# Patient Record
Sex: MALE | Race: WHITE | NOT HISPANIC OR LATINO | Employment: OTHER | URBAN - METROPOLITAN AREA
[De-identification: names, ages, dates, MRNs, and addresses within clinical notes are randomized per-mention and may not be internally consistent; named-entity substitution may affect disease eponyms.]

---

## 2017-01-01 ENCOUNTER — APPOINTMENT (OUTPATIENT)
Dept: CARDIOLOGY | Facility: HOSPITAL | Age: 82
End: 2017-01-01
Attending: INTERNAL MEDICINE

## 2017-01-01 ENCOUNTER — HOSPITAL ENCOUNTER (INPATIENT)
Facility: HOSPITAL | Age: 82
End: 2017-01-01
Attending: INTERNAL MEDICINE | Admitting: INTERNAL MEDICINE

## 2017-01-01 ENCOUNTER — DOCUMENTATION (OUTPATIENT)
Dept: PSYCHIATRY | Facility: HOSPITAL | Age: 82
End: 2017-01-01

## 2017-01-01 ENCOUNTER — HOSPITAL ENCOUNTER (INPATIENT)
Facility: HOSPITAL | Age: 82
LOS: 6 days | End: 2017-08-24
Attending: INTERNAL MEDICINE | Admitting: INTERNAL MEDICINE

## 2017-01-01 ENCOUNTER — HOSPITAL ENCOUNTER (INPATIENT)
Facility: HOSPITAL | Age: 82
LOS: 12 days | End: 2017-08-18
Attending: SPECIALIST | Admitting: SPECIALIST

## 2017-01-01 ENCOUNTER — APPOINTMENT (OUTPATIENT)
Dept: GENERAL RADIOLOGY | Facility: HOSPITAL | Age: 82
End: 2017-01-01

## 2017-01-01 ENCOUNTER — HOSPITAL ENCOUNTER (INPATIENT)
Facility: HOSPITAL | Age: 82
LOS: 3 days | End: 2017-08-06
Attending: EMERGENCY MEDICINE | Admitting: HOSPITALIST

## 2017-01-01 VITALS
RESPIRATION RATE: 18 BRPM | TEMPERATURE: 98.5 F | DIASTOLIC BLOOD PRESSURE: 83 MMHG | SYSTOLIC BLOOD PRESSURE: 145 MMHG | BODY MASS INDEX: 29.78 KG/M2 | OXYGEN SATURATION: 94 % | HEART RATE: 72 BPM | WEIGHT: 162.8 LBS

## 2017-01-01 VITALS
SYSTOLIC BLOOD PRESSURE: 125 MMHG | RESPIRATION RATE: 16 BRPM | TEMPERATURE: 98.2 F | HEART RATE: 86 BPM | WEIGHT: 176 LBS | BODY MASS INDEX: 32.39 KG/M2 | DIASTOLIC BLOOD PRESSURE: 55 MMHG | HEIGHT: 62 IN | OXYGEN SATURATION: 94 %

## 2017-01-01 VITALS — TEMPERATURE: 103 F

## 2017-01-01 DIAGNOSIS — R50.9 FEVER IN ADULT: ICD-10-CM

## 2017-01-01 DIAGNOSIS — N39.0 ACUTE UTI: Primary | ICD-10-CM

## 2017-01-01 LAB
ALBUMIN SERPL-MCNC: 3.4 G/DL (ref 3.5–5.2)
ALBUMIN/GLOB SERPL: 1 G/DL
ALP SERPL-CCNC: 83 U/L (ref 39–117)
ALT SERPL W P-5'-P-CCNC: 20 U/L (ref 1–41)
ANION GAP SERPL CALCULATED.3IONS-SCNC: 13.9 MMOL/L
ANION GAP SERPL CALCULATED.3IONS-SCNC: 14.4 MMOL/L
ANION GAP SERPL CALCULATED.3IONS-SCNC: 15.6 MMOL/L
AST SERPL-CCNC: 21 U/L (ref 1–40)
BACTERIA BLD CULT: ABNORMAL
BACTERIA SPEC AEROBE CULT: ABNORMAL
BACTERIA SPEC AEROBE CULT: ABNORMAL
BACTERIA SPEC AEROBE CULT: NORMAL
BACTERIA UR QL AUTO: ABNORMAL /HPF
BASOPHILS # BLD AUTO: 0.02 10*3/MM3 (ref 0–0.2)
BASOPHILS # BLD AUTO: 0.05 10*3/MM3 (ref 0–0.2)
BASOPHILS NFR BLD AUTO: 0.1 % (ref 0–1.5)
BASOPHILS NFR BLD AUTO: 0.5 % (ref 0–1.5)
BH CV ECHO MEAS - ACS: 2.1 CM
BH CV ECHO MEAS - AO MAX PG: 7 MMHG
BH CV ECHO MEAS - AO MEAN PG (FULL): -1 MMHG
BH CV ECHO MEAS - AO MEAN PG: 3 MMHG
BH CV ECHO MEAS - AO ROOT AREA (BSA CORRECTED): 2.2
BH CV ECHO MEAS - AO ROOT AREA: 11.9 CM^2
BH CV ECHO MEAS - AO ROOT DIAM: 3.9 CM
BH CV ECHO MEAS - AO V2 MAX: 134 CM/SEC
BH CV ECHO MEAS - AO V2 MEAN: 83 CM/SEC
BH CV ECHO MEAS - AO V2 VTI: 24.2 CM
BH CV ECHO MEAS - AVA(I,A): 5.2 CM^2
BH CV ECHO MEAS - AVA(I,D): 5.2 CM^2
BH CV ECHO MEAS - BSA(HAYCOCK): 1.9 M^2
BH CV ECHO MEAS - BSA: 1.8 M^2
BH CV ECHO MEAS - BZI_BMI: 31.3 KILOGRAMS/M^2
BH CV ECHO MEAS - BZI_METRIC_HEIGHT: 157.5 CM
BH CV ECHO MEAS - BZI_METRIC_WEIGHT: 77.6 KG
BH CV ECHO MEAS - CONTRAST EF (2CH): 70.5 ML/M^2
BH CV ECHO MEAS - CONTRAST EF 4CH: 69.7 ML/M^2
BH CV ECHO MEAS - EDV(CUBED): 103.8 ML
BH CV ECHO MEAS - EDV(MOD-SP2): 88 ML
BH CV ECHO MEAS - EDV(MOD-SP4): 89 ML
BH CV ECHO MEAS - EDV(TEICH): 102.4 ML
BH CV ECHO MEAS - EF(CUBED): 78.9 %
BH CV ECHO MEAS - EF(MOD-SP2): 70.5 %
BH CV ECHO MEAS - EF(MOD-SP4): 69.7 %
BH CV ECHO MEAS - EF(TEICH): 71.1 %
BH CV ECHO MEAS - ESV(CUBED): 22 ML
BH CV ECHO MEAS - ESV(MOD-SP2): 26 ML
BH CV ECHO MEAS - ESV(MOD-SP4): 27 ML
BH CV ECHO MEAS - ESV(TEICH): 29.6 ML
BH CV ECHO MEAS - FS: 40.4 %
BH CV ECHO MEAS - IVS/LVPW: 1
BH CV ECHO MEAS - IVSD: 1.1 CM
BH CV ECHO MEAS - LA DIMENSION: 4 CM
BH CV ECHO MEAS - LA/AO: 1
BH CV ECHO MEAS - LAT PEAK E' VEL: 4 CM/SEC
BH CV ECHO MEAS - LV DIASTOLIC VOL/BSA (35-75): 49.8 ML/M^2
BH CV ECHO MEAS - LV MASS(C)D: 187.5 GRAMS
BH CV ECHO MEAS - LV MASS(C)DI: 104.9 GRAMS/M^2
BH CV ECHO MEAS - LV MEAN PG: 4 MMHG
BH CV ECHO MEAS - LV SYSTOLIC VOL/BSA (12-30): 15.1 ML/M^2
BH CV ECHO MEAS - LV V1 MAX: 133 CM/SEC
BH CV ECHO MEAS - LV V1 MEAN: 101 CM/SEC
BH CV ECHO MEAS - LV V1 VTI: 30.5 CM
BH CV ECHO MEAS - LVIDD: 4.7 CM
BH CV ECHO MEAS - LVIDS: 2.8 CM
BH CV ECHO MEAS - LVLD AP2: 8.3 CM
BH CV ECHO MEAS - LVLD AP4: 8.7 CM
BH CV ECHO MEAS - LVLS AP2: 7.3 CM
BH CV ECHO MEAS - LVLS AP4: 7.7 CM
BH CV ECHO MEAS - LVOT AREA (M): 4.2 CM^2
BH CV ECHO MEAS - LVOT AREA: 4.2 CM^2
BH CV ECHO MEAS - LVOT DIAM: 2.3 CM
BH CV ECHO MEAS - LVPWD: 1.1 CM
BH CV ECHO MEAS - MED PEAK E' VEL: 4 CM/SEC
BH CV ECHO MEAS - MV A DUR: 0.15 SEC
BH CV ECHO MEAS - MV A MAX VEL: 112 CM/SEC
BH CV ECHO MEAS - MV DEC SLOPE: 432 CM/SEC^2
BH CV ECHO MEAS - MV DEC TIME: 0.33 SEC
BH CV ECHO MEAS - MV E MAX VEL: 72.6 CM/SEC
BH CV ECHO MEAS - MV E/A: 0.65
BH CV ECHO MEAS - MV MEAN PG: 2 MMHG
BH CV ECHO MEAS - MV P1/2T MAX VEL: 86.9 CM/SEC
BH CV ECHO MEAS - MV P1/2T: 58.9 MSEC
BH CV ECHO MEAS - MV V2 MEAN: 67.5 CM/SEC
BH CV ECHO MEAS - MV V2 VTI: 26.3 CM
BH CV ECHO MEAS - MVA P1/2T LCG: 2.5 CM^2
BH CV ECHO MEAS - MVA(P1/2T): 3.7 CM^2
BH CV ECHO MEAS - MVA(VTI): 4.8 CM^2
BH CV ECHO MEAS - PA ACC SLOPE: 869 CM/SEC^2
BH CV ECHO MEAS - PA ACC TIME: 0.13 SEC
BH CV ECHO MEAS - PA MAX PG (FULL): 2.5 MMHG
BH CV ECHO MEAS - PA MAX PG: 5.2 MMHG
BH CV ECHO MEAS - PA PR(ACCEL): 20.5 MMHG
BH CV ECHO MEAS - PA V2 MAX: 114 CM/SEC
BH CV ECHO MEAS - PI END-D VEL: 97 CM/SEC
BH CV ECHO MEAS - PULM A REVS DUR: 0.15 SEC
BH CV ECHO MEAS - PULM A REVS VEL: 37.5 CM/SEC
BH CV ECHO MEAS - PULM DIAS VEL: 38.5 CM/SEC
BH CV ECHO MEAS - PULM S/D: 1.4
BH CV ECHO MEAS - PULM SYS VEL: 52.8 CM/SEC
BH CV ECHO MEAS - PVA(V,A): 2.5 CM^2
BH CV ECHO MEAS - PVA(V,D): 2.5 CM^2
BH CV ECHO MEAS - QP/QS: 0.37
BH CV ECHO MEAS - RAP SYSTOLE: 3 MMHG
BH CV ECHO MEAS - RV MAX PG: 2.7 MMHG
BH CV ECHO MEAS - RV MEAN PG: 1 MMHG
BH CV ECHO MEAS - RV V1 MAX: 81.9 CM/SEC
BH CV ECHO MEAS - RV V1 MEAN: 53.8 CM/SEC
BH CV ECHO MEAS - RV V1 VTI: 13.4 CM
BH CV ECHO MEAS - RVOT AREA: 3.5 CM^2
BH CV ECHO MEAS - RVOT DIAM: 2.1 CM
BH CV ECHO MEAS - RVSP: 33.7 MMHG
BH CV ECHO MEAS - SI(AO): 161.6 ML/M^2
BH CV ECHO MEAS - SI(CUBED): 45.8 ML/M^2
BH CV ECHO MEAS - SI(LVOT): 70.9 ML/M^2
BH CV ECHO MEAS - SI(MOD-SP2): 34.7 ML/M^2
BH CV ECHO MEAS - SI(MOD-SP4): 34.7 ML/M^2
BH CV ECHO MEAS - SI(TEICH): 40.7 ML/M^2
BH CV ECHO MEAS - SV(AO): 289.1 ML
BH CV ECHO MEAS - SV(CUBED): 81.9 ML
BH CV ECHO MEAS - SV(LVOT): 126.7 ML
BH CV ECHO MEAS - SV(MOD-SP2): 62 ML
BH CV ECHO MEAS - SV(MOD-SP4): 62 ML
BH CV ECHO MEAS - SV(RVOT): 46.4 ML
BH CV ECHO MEAS - SV(TEICH): 72.8 ML
BH CV ECHO MEAS - TAPSE (>1.6): 1.8 CM2
BH CV ECHO MEAS - TR MAX VEL: 277 CM/SEC
BH CV VAS BP RIGHT ARM: NORMAL MMHG
BH CV XLRA - RV BASE: 2.8 CM
BH CV XLRA - RV LENGTH: 7.4 CM
BH CV XLRA - RV MID: 2.2 CM
BH CV XLRA - TDI S': 19 CM/SEC
BILIRUB SERPL-MCNC: 0.5 MG/DL (ref 0.1–1.2)
BILIRUB UR QL STRIP: NEGATIVE
BUN BLD-MCNC: 15 MG/DL (ref 8–23)
BUN BLD-MCNC: 18 MG/DL (ref 8–23)
BUN BLD-MCNC: 24 MG/DL (ref 8–23)
BUN/CREAT SERPL: 16 (ref 7–25)
BUN/CREAT SERPL: 18.6 (ref 7–25)
BUN/CREAT SERPL: 18.8 (ref 7–25)
CALCIUM SPEC-SCNC: 8.4 MG/DL (ref 8.6–10.5)
CALCIUM SPEC-SCNC: 8.8 MG/DL (ref 8.6–10.5)
CALCIUM SPEC-SCNC: 8.9 MG/DL (ref 8.6–10.5)
CHLORIDE SERPL-SCNC: 100 MMOL/L (ref 98–107)
CHLORIDE SERPL-SCNC: 101 MMOL/L (ref 98–107)
CHLORIDE SERPL-SCNC: 103 MMOL/L (ref 98–107)
CHOLEST SERPL-MCNC: 118 MG/DL (ref 0–200)
CLARITY UR: ABNORMAL
CO2 SERPL-SCNC: 19.4 MMOL/L (ref 22–29)
CO2 SERPL-SCNC: 22.1 MMOL/L (ref 22–29)
CO2 SERPL-SCNC: 22.6 MMOL/L (ref 22–29)
COLOR UR: ABNORMAL
CREAT BLD-MCNC: 0.94 MG/DL (ref 0.76–1.27)
CREAT BLD-MCNC: 0.97 MG/DL (ref 0.76–1.27)
CREAT BLD-MCNC: 1.28 MG/DL (ref 0.76–1.27)
D-LACTATE SERPL-SCNC: 2 MMOL/L (ref 0.5–2)
DEPRECATED RDW RBC AUTO: 53.3 FL (ref 37–54)
DEPRECATED RDW RBC AUTO: 54.1 FL (ref 37–54)
DEPRECATED RDW RBC AUTO: 55.5 FL (ref 37–54)
E/E' RATIO: 19
EOSINOPHIL # BLD AUTO: 0.01 10*3/MM3 (ref 0–0.7)
EOSINOPHIL # BLD AUTO: 0.05 10*3/MM3 (ref 0–0.7)
EOSINOPHIL NFR BLD AUTO: 0.1 % (ref 0.3–6.2)
EOSINOPHIL NFR BLD AUTO: 0.5 % (ref 0.3–6.2)
ERYTHROCYTE [DISTWIDTH] IN BLOOD BY AUTOMATED COUNT: 15.2 % (ref 11.5–14.5)
ERYTHROCYTE [DISTWIDTH] IN BLOOD BY AUTOMATED COUNT: 15.4 % (ref 11.5–14.5)
ERYTHROCYTE [DISTWIDTH] IN BLOOD BY AUTOMATED COUNT: 15.6 % (ref 11.5–14.5)
GFR SERPL CREATININE-BSD FRML MDRD: 54 ML/MIN/1.73
GFR SERPL CREATININE-BSD FRML MDRD: 74 ML/MIN/1.73
GFR SERPL CREATININE-BSD FRML MDRD: 76 ML/MIN/1.73
GLOBULIN UR ELPH-MCNC: 3.4 GM/DL
GLUCOSE BLD-MCNC: 105 MG/DL (ref 65–99)
GLUCOSE BLD-MCNC: 119 MG/DL (ref 65–99)
GLUCOSE BLD-MCNC: 124 MG/DL (ref 65–99)
GLUCOSE BLDC GLUCOMTR-MCNC: 106 MG/DL (ref 70–130)
GLUCOSE UR STRIP-MCNC: NEGATIVE MG/DL
GRAM STN SPEC: ABNORMAL
HCT VFR BLD AUTO: 31.2 % (ref 40.4–52.2)
HCT VFR BLD AUTO: 33.1 % (ref 40.4–52.2)
HCT VFR BLD AUTO: 33.3 % (ref 40.4–52.2)
HDLC SERPL-MCNC: 29 MG/DL (ref 40–60)
HGB BLD-MCNC: 10.2 G/DL (ref 13.7–17.6)
HGB BLD-MCNC: 10.4 G/DL (ref 13.7–17.6)
HGB BLD-MCNC: 10.8 G/DL (ref 13.7–17.6)
HGB UR QL STRIP.AUTO: ABNORMAL
HOLD SPECIMEN: NORMAL
HOLD SPECIMEN: NORMAL
HYALINE CASTS UR QL AUTO: ABNORMAL /LPF
IMM GRANULOCYTES # BLD: 0.04 10*3/MM3 (ref 0–0.03)
IMM GRANULOCYTES # BLD: 0.06 10*3/MM3 (ref 0–0.03)
IMM GRANULOCYTES NFR BLD: 0.4 % (ref 0–0.5)
IMM GRANULOCYTES NFR BLD: 0.4 % (ref 0–0.5)
INR PPP: 1.33 (ref 0.9–1.1)
KETONES UR QL STRIP: NEGATIVE
LDLC SERPL CALC-MCNC: 65 MG/DL (ref 0–100)
LDLC/HDLC SERPL: 2.24 {RATIO}
LEFT ATRIUM VOLUME INDEX: 33 ML/M2
LEFT ATRIUM VOLUME: 52 CM3
LEUKOCYTE ESTERASE UR QL STRIP.AUTO: ABNORMAL
LV EF 2D ECHO EST: 69 %
LYMPHOCYTES # BLD AUTO: 1.42 10*3/MM3 (ref 0.9–4.8)
LYMPHOCYTES # BLD AUTO: 1.93 10*3/MM3 (ref 0.9–4.8)
LYMPHOCYTES NFR BLD AUTO: 10.1 % (ref 19.6–45.3)
LYMPHOCYTES NFR BLD AUTO: 20.8 % (ref 19.6–45.3)
MAGNESIUM SERPL-MCNC: 2 MG/DL (ref 1.6–2.4)
MCH RBC QN AUTO: 30.3 PG (ref 27–32.7)
MCH RBC QN AUTO: 31.1 PG (ref 27–32.7)
MCH RBC QN AUTO: 31.5 PG (ref 27–32.7)
MCHC RBC AUTO-ENTMCNC: 31.2 G/DL (ref 32.6–36.4)
MCHC RBC AUTO-ENTMCNC: 32.6 G/DL (ref 32.6–36.4)
MCHC RBC AUTO-ENTMCNC: 32.7 G/DL (ref 32.6–36.4)
MCV RBC AUTO: 95.4 FL (ref 79.8–96.2)
MCV RBC AUTO: 96.3 FL (ref 79.8–96.2)
MCV RBC AUTO: 97.1 FL (ref 79.8–96.2)
MONOCYTES # BLD AUTO: 1.51 10*3/MM3 (ref 0.2–1.2)
MONOCYTES # BLD AUTO: 1.85 10*3/MM3 (ref 0.2–1.2)
MONOCYTES NFR BLD AUTO: 13.1 % (ref 5–12)
MONOCYTES NFR BLD AUTO: 16.2 % (ref 5–12)
NEUTROPHILS # BLD AUTO: 10.76 10*3/MM3 (ref 1.9–8.1)
NEUTROPHILS # BLD AUTO: 5.72 10*3/MM3 (ref 1.9–8.1)
NEUTROPHILS NFR BLD AUTO: 61.6 % (ref 42.7–76)
NEUTROPHILS NFR BLD AUTO: 76.2 % (ref 42.7–76)
NITRITE UR QL STRIP: NEGATIVE
NT-PROBNP SERPL-MCNC: 1999 PG/ML (ref 0–1800)
PH UR STRIP.AUTO: 7 [PH] (ref 5–8)
PLATELET # BLD AUTO: 230 10*3/MM3 (ref 140–500)
PLATELET # BLD AUTO: 234 10*3/MM3 (ref 140–500)
PLATELET # BLD AUTO: 264 10*3/MM3 (ref 140–500)
PMV BLD AUTO: 10.2 FL (ref 6–12)
PMV BLD AUTO: 10.3 FL (ref 6–12)
PMV BLD AUTO: 10.3 FL (ref 6–12)
POTASSIUM BLD-SCNC: 3.4 MMOL/L (ref 3.5–5.2)
POTASSIUM BLD-SCNC: 4.1 MMOL/L (ref 3.5–5.2)
POTASSIUM BLD-SCNC: 4.1 MMOL/L (ref 3.5–5.2)
PROCALCITONIN SERPL-MCNC: 0.2 NG/ML (ref 0.1–0.25)
PROT SERPL-MCNC: 6.8 G/DL (ref 6–8.5)
PROT UR QL STRIP: ABNORMAL
PROTHROMBIN TIME: 16 SECONDS (ref 11.7–14.2)
RBC # BLD AUTO: 3.24 10*6/MM3 (ref 4.6–6)
RBC # BLD AUTO: 3.43 10*6/MM3 (ref 4.6–6)
RBC # BLD AUTO: 3.47 10*6/MM3 (ref 4.6–6)
RBC # UR: ABNORMAL /HPF
REF LAB TEST METHOD: ABNORMAL
SODIUM BLD-SCNC: 136 MMOL/L (ref 136–145)
SODIUM BLD-SCNC: 138 MMOL/L (ref 136–145)
SODIUM BLD-SCNC: 138 MMOL/L (ref 136–145)
SP GR UR STRIP: 1.01 (ref 1–1.03)
SQUAMOUS #/AREA URNS HPF: ABNORMAL /HPF
TRIGL SERPL-MCNC: 120 MG/DL (ref 0–150)
TROPONIN T SERPL-MCNC: 0.03 NG/ML (ref 0–0.03)
TROPONIN T SERPL-MCNC: 0.05 NG/ML (ref 0–0.03)
TROPONIN T SERPL-MCNC: 0.06 NG/ML (ref 0–0.03)
UROBILINOGEN UR QL STRIP: ABNORMAL
VALPROATE SERPL-MCNC: 37 MCG/ML (ref 50–125)
VANCOMYCIN SERPL-MCNC: 8.5 MCG/ML (ref 5–40)
VLDLC SERPL-MCNC: 24 MG/DL (ref 5–40)
WBC NRBC COR # BLD: 14.12 10*3/MM3 (ref 4.5–10.7)
WBC NRBC COR # BLD: 7.72 10*3/MM3 (ref 4.5–10.7)
WBC NRBC COR # BLD: 9.3 10*3/MM3 (ref 4.5–10.7)
WBC UR QL AUTO: ABNORMAL /HPF
WHOLE BLOOD HOLD SPECIMEN: NORMAL
WHOLE BLOOD HOLD SPECIMEN: NORMAL

## 2017-01-01 PROCEDURE — 93010 ELECTROCARDIOGRAM REPORT: CPT | Performed by: INTERNAL MEDICINE

## 2017-01-01 PROCEDURE — 25010000002 LORAZEPAM PER 2 MG: Performed by: INTERNAL MEDICINE

## 2017-01-01 PROCEDURE — 94799 UNLISTED PULMONARY SVC/PX: CPT

## 2017-01-01 PROCEDURE — 25010000002 MORPHINE PER 10 MG: Performed by: INTERNAL MEDICINE

## 2017-01-01 PROCEDURE — 25010000002 VANCOMYCIN 10 G RECONSTITUTED SOLUTION: Performed by: INTERNAL MEDICINE

## 2017-01-01 PROCEDURE — 99231 SBSQ HOSP IP/OBS SF/LOW 25: CPT | Performed by: INTERNAL MEDICINE

## 2017-01-01 PROCEDURE — 99285 EMERGENCY DEPT VISIT HI MDM: CPT

## 2017-01-01 PROCEDURE — 25010000002 LORAZEPAM PER 2 MG: Performed by: SPECIALIST

## 2017-01-01 PROCEDURE — 25010000002 ENOXAPARIN PER 10 MG: Performed by: HOSPITALIST

## 2017-01-01 PROCEDURE — 71010 HC CHEST PA OR AP: CPT

## 2017-01-01 PROCEDURE — 84145 PROCALCITONIN (PCT): CPT | Performed by: EMERGENCY MEDICINE

## 2017-01-01 PROCEDURE — 81001 URINALYSIS AUTO W/SCOPE: CPT | Performed by: EMERGENCY MEDICINE

## 2017-01-01 PROCEDURE — 99238 HOSP IP/OBS DSCHRG MGMT 30/<: CPT | Performed by: INTERNAL MEDICINE

## 2017-01-01 PROCEDURE — 94640 AIRWAY INHALATION TREATMENT: CPT

## 2017-01-01 PROCEDURE — 80048 BASIC METABOLIC PNL TOTAL CA: CPT | Performed by: HOSPITALIST

## 2017-01-01 PROCEDURE — 85610 PROTHROMBIN TIME: CPT | Performed by: EMERGENCY MEDICINE

## 2017-01-01 PROCEDURE — 84484 ASSAY OF TROPONIN QUANT: CPT | Performed by: INTERNAL MEDICINE

## 2017-01-01 PROCEDURE — 25010000002 HALOPERIDOL LACTATE PER 5 MG: Performed by: SPECIALIST

## 2017-01-01 PROCEDURE — 84484 ASSAY OF TROPONIN QUANT: CPT | Performed by: EMERGENCY MEDICINE

## 2017-01-01 PROCEDURE — 87186 SC STD MICRODIL/AGAR DIL: CPT | Performed by: EMERGENCY MEDICINE

## 2017-01-01 PROCEDURE — 83735 ASSAY OF MAGNESIUM: CPT | Performed by: INTERNAL MEDICINE

## 2017-01-01 PROCEDURE — 82962 GLUCOSE BLOOD TEST: CPT

## 2017-01-01 PROCEDURE — 87040 BLOOD CULTURE FOR BACTERIA: CPT | Performed by: EMERGENCY MEDICINE

## 2017-01-01 PROCEDURE — 83880 ASSAY OF NATRIURETIC PEPTIDE: CPT | Performed by: EMERGENCY MEDICINE

## 2017-01-01 PROCEDURE — 80061 LIPID PANEL: CPT | Performed by: SPECIALIST

## 2017-01-01 PROCEDURE — 85027 COMPLETE CBC AUTOMATED: CPT | Performed by: INTERNAL MEDICINE

## 2017-01-01 PROCEDURE — 85025 COMPLETE CBC W/AUTO DIFF WBC: CPT | Performed by: EMERGENCY MEDICINE

## 2017-01-01 PROCEDURE — 25010000002 CEFTRIAXONE PER 250 MG: Performed by: HOSPITALIST

## 2017-01-01 PROCEDURE — 87147 CULTURE TYPE IMMUNOLOGIC: CPT | Performed by: EMERGENCY MEDICINE

## 2017-01-01 PROCEDURE — 25010000002 ZIPRASIDONE MESYLATE PER 10 MG: Performed by: SPECIALIST

## 2017-01-01 PROCEDURE — 87150 DNA/RNA AMPLIFIED PROBE: CPT | Performed by: EMERGENCY MEDICINE

## 2017-01-01 PROCEDURE — 80053 COMPREHEN METABOLIC PANEL: CPT | Performed by: EMERGENCY MEDICINE

## 2017-01-01 PROCEDURE — 87040 BLOOD CULTURE FOR BACTERIA: CPT | Performed by: INTERNAL MEDICINE

## 2017-01-01 PROCEDURE — 93306 TTE W/DOPPLER COMPLETE: CPT | Performed by: INTERNAL MEDICINE

## 2017-01-01 PROCEDURE — 85025 COMPLETE CBC W/AUTO DIFF WBC: CPT | Performed by: HOSPITALIST

## 2017-01-01 PROCEDURE — 99222 1ST HOSP IP/OBS MODERATE 55: CPT | Performed by: INTERNAL MEDICINE

## 2017-01-01 PROCEDURE — 81003 URINALYSIS AUTO W/O SCOPE: CPT | Performed by: EMERGENCY MEDICINE

## 2017-01-01 PROCEDURE — 93005 ELECTROCARDIOGRAM TRACING: CPT | Performed by: EMERGENCY MEDICINE

## 2017-01-01 PROCEDURE — 87086 URINE CULTURE/COLONY COUNT: CPT | Performed by: EMERGENCY MEDICINE

## 2017-01-01 PROCEDURE — 80164 ASSAY DIPROPYLACETIC ACD TOT: CPT | Performed by: SPECIALIST

## 2017-01-01 PROCEDURE — 25010000002 CEFTRIAXONE PER 250 MG: Performed by: EMERGENCY MEDICINE

## 2017-01-01 PROCEDURE — 80202 ASSAY OF VANCOMYCIN: CPT | Performed by: INTERNAL MEDICINE

## 2017-01-01 PROCEDURE — 90791 PSYCH DIAGNOSTIC EVALUATION: CPT

## 2017-01-01 PROCEDURE — 25010000002 HALOPERIDOL LACTATE PER 5 MG: Performed by: INTERNAL MEDICINE

## 2017-01-01 PROCEDURE — 83605 ASSAY OF LACTIC ACID: CPT | Performed by: EMERGENCY MEDICINE

## 2017-01-01 PROCEDURE — 93005 ELECTROCARDIOGRAM TRACING: CPT | Performed by: INTERNAL MEDICINE

## 2017-01-01 PROCEDURE — 93306 TTE W/DOPPLER COMPLETE: CPT

## 2017-01-01 RX ORDER — MEMANTINE HYDROCHLORIDE 5 MG/1
5 TABLET ORAL NIGHTLY
COMMUNITY

## 2017-01-01 RX ORDER — GLYCOPYRROLATE 0.2 MG/ML
0.4 INJECTION INTRAMUSCULAR; INTRAVENOUS
Status: DISCONTINUED | OUTPATIENT
Start: 2017-01-01 | End: 2017-01-01

## 2017-01-01 RX ORDER — DOXYCYCLINE 100 MG/1
100 CAPSULE ORAL EVERY 12 HOURS SCHEDULED
Refills: 0
Start: 2017-01-01 | End: 2017-01-01 | Stop reason: HOSPADM

## 2017-01-01 RX ORDER — CYCLOBENZAPRINE HCL 5 MG
5 TABLET ORAL DAILY PRN
Status: ON HOLD | COMMUNITY
End: 2017-01-01

## 2017-01-01 RX ORDER — HALOPERIDOL 5 MG/ML
2 INJECTION INTRAMUSCULAR EVERY 4 HOURS PRN
Status: DISCONTINUED | OUTPATIENT
Start: 2017-01-01 | End: 2017-08-25 | Stop reason: HOSPADM

## 2017-01-01 RX ORDER — LORAZEPAM 2 MG/ML
2 INJECTION INTRAMUSCULAR ONCE
Status: COMPLETED | OUTPATIENT
Start: 2017-01-01 | End: 2017-01-01

## 2017-01-01 RX ORDER — LORAZEPAM 2 MG/ML
0.5 CONCENTRATE ORAL
Status: DISCONTINUED | OUTPATIENT
Start: 2017-01-01 | End: 2017-01-01

## 2017-01-01 RX ORDER — ACETAMINOPHEN 325 MG/1
650 TABLET ORAL EVERY 4 HOURS PRN
Refills: 0 | Status: ON HOLD
Start: 2017-01-01 | End: 2017-01-01

## 2017-01-01 RX ORDER — ASPIRIN 81 MG/1
81 TABLET ORAL DAILY
Status: DISCONTINUED | OUTPATIENT
Start: 2017-01-01 | End: 2017-01-01 | Stop reason: SDUPTHER

## 2017-01-01 RX ORDER — CYCLOBENZAPRINE HCL 5 MG
5 TABLET ORAL DAILY PRN
Status: DISCONTINUED | OUTPATIENT
Start: 2017-01-01 | End: 2017-01-01 | Stop reason: HOSPADM

## 2017-01-01 RX ORDER — BUDESONIDE AND FORMOTEROL FUMARATE DIHYDRATE 160; 4.5 UG/1; UG/1
2 AEROSOL RESPIRATORY (INHALATION)
Status: COMPLETED | OUTPATIENT
Start: 2017-01-01 | End: 2017-01-01

## 2017-01-01 RX ORDER — HALOPERIDOL 2 MG/ML
2 SOLUTION ORAL EVERY 4 HOURS PRN
Status: DISCONTINUED | OUTPATIENT
Start: 2017-01-01 | End: 2017-08-25 | Stop reason: HOSPADM

## 2017-01-01 RX ORDER — CHOLECALCIFEROL (VITAMIN D3) 125 MCG
5 CAPSULE ORAL NIGHTLY
Status: COMPLETED | OUTPATIENT
Start: 2017-01-01 | End: 2017-01-01

## 2017-01-01 RX ORDER — CLONIDINE HYDROCHLORIDE 0.1 MG/1
0.1 TABLET ORAL EVERY 4 HOURS PRN
Status: ON HOLD | COMMUNITY
End: 2017-01-01

## 2017-01-01 RX ORDER — CHOLECALCIFEROL (VITAMIN D3) 125 MCG
5 CAPSULE ORAL NIGHTLY PRN
Status: DISCONTINUED | OUTPATIENT
Start: 2017-01-01 | End: 2017-01-01 | Stop reason: HOSPADM

## 2017-01-01 RX ORDER — ONDANSETRON 4 MG/1
4 TABLET, FILM COATED ORAL EVERY 6 HOURS PRN
Status: DISCONTINUED | OUTPATIENT
Start: 2017-01-01 | End: 2017-01-01 | Stop reason: HOSPADM

## 2017-01-01 RX ORDER — LORAZEPAM 2 MG/ML
0.5 INJECTION INTRAMUSCULAR
Status: DISCONTINUED | OUTPATIENT
Start: 2017-01-01 | End: 2017-01-01

## 2017-01-01 RX ORDER — CHOLECALCIFEROL (VITAMIN D3) 125 MCG
5 CAPSULE ORAL NIGHTLY
Status: DISCONTINUED | OUTPATIENT
Start: 2017-01-01 | End: 2017-01-01 | Stop reason: HOSPADM

## 2017-01-01 RX ORDER — QUETIAPINE FUMARATE 50 MG/1
50 TABLET, FILM COATED ORAL NIGHTLY
Status: COMPLETED | OUTPATIENT
Start: 2017-01-01 | End: 2017-01-01

## 2017-01-01 RX ORDER — LORAZEPAM 1 MG/1
1 TABLET ORAL ONCE
Status: COMPLETED | OUTPATIENT
Start: 2017-01-01 | End: 2017-01-01

## 2017-01-01 RX ORDER — MIRTAZAPINE 15 MG/1
7.5 TABLET, FILM COATED ORAL NIGHTLY
Status: DISCONTINUED | OUTPATIENT
Start: 2017-01-01 | End: 2017-01-01 | Stop reason: HOSPADM

## 2017-01-01 RX ORDER — CEFTRIAXONE SODIUM 1 G/50ML
1 INJECTION, SOLUTION INTRAVENOUS EVERY 24 HOURS
Status: DISCONTINUED | OUTPATIENT
Start: 2017-01-01 | End: 2017-01-01

## 2017-01-01 RX ORDER — QUETIAPINE FUMARATE 25 MG/1
12.5 TABLET, FILM COATED ORAL DAILY
Status: ON HOLD | COMMUNITY
End: 2017-01-01

## 2017-01-01 RX ORDER — HALOPERIDOL 2 MG/1
2 TABLET ORAL 3 TIMES DAILY
Status: DISCONTINUED | OUTPATIENT
Start: 2017-01-01 | End: 2017-01-01

## 2017-01-01 RX ORDER — MORPHINE SULFATE 100 MG/5ML
5 SOLUTION ORAL
Status: DISCONTINUED | OUTPATIENT
Start: 2017-01-01 | End: 2017-08-25 | Stop reason: HOSPADM

## 2017-01-01 RX ORDER — SODIUM CHLORIDE 0.9 % (FLUSH) 0.9 %
10 SYRINGE (ML) INJECTION AS NEEDED
Status: DISCONTINUED | OUTPATIENT
Start: 2017-01-01 | End: 2017-01-01 | Stop reason: HOSPADM

## 2017-01-01 RX ORDER — MEMANTINE HYDROCHLORIDE 5 MG/1
5 TABLET ORAL NIGHTLY
Status: COMPLETED | OUTPATIENT
Start: 2017-01-01 | End: 2017-01-01

## 2017-01-01 RX ORDER — IPRATROPIUM BROMIDE AND ALBUTEROL SULFATE 2.5; .5 MG/3ML; MG/3ML
3 SOLUTION RESPIRATORY (INHALATION) EVERY 6 HOURS PRN
Status: DISCONTINUED | OUTPATIENT
Start: 2017-01-01 | End: 2017-01-01 | Stop reason: SDUPTHER

## 2017-01-01 RX ORDER — HALOPERIDOL 5 MG/ML
2 INJECTION INTRAMUSCULAR 3 TIMES DAILY PRN
Status: DISCONTINUED | OUTPATIENT
Start: 2017-01-01 | End: 2017-01-01 | Stop reason: HOSPADM

## 2017-01-01 RX ORDER — FLUTICASONE PROPIONATE 50 MCG
1 SPRAY, SUSPENSION (ML) NASAL DAILY
Status: COMPLETED | OUTPATIENT
Start: 2017-01-01 | End: 2017-01-01

## 2017-01-01 RX ORDER — DIVALPROEX SODIUM 250 MG/1
250 TABLET, DELAYED RELEASE ORAL 4 TIMES DAILY
Status: DISCONTINUED | OUTPATIENT
Start: 2017-01-01 | End: 2017-01-01

## 2017-01-01 RX ORDER — DIVALPROEX SODIUM 250 MG/1
250 TABLET, DELAYED RELEASE ORAL 4 TIMES DAILY
COMMUNITY

## 2017-01-01 RX ORDER — HALOPERIDOL 1 MG/1
1 TABLET ORAL EVERY 4 HOURS PRN
Status: DISCONTINUED | OUTPATIENT
Start: 2017-01-01 | End: 2017-08-25 | Stop reason: HOSPADM

## 2017-01-01 RX ORDER — CHOLECALCIFEROL (VITAMIN D3) 125 MCG
1000 CAPSULE ORAL DAILY
Status: DISCONTINUED | OUTPATIENT
Start: 2017-01-01 | End: 2017-01-01

## 2017-01-01 RX ORDER — CYCLOBENZAPRINE HCL 5 MG
5 TABLET ORAL DAILY
Status: COMPLETED | OUTPATIENT
Start: 2017-01-01 | End: 2017-01-01

## 2017-01-01 RX ORDER — LORAZEPAM 2 MG/ML
0.5 INJECTION INTRAMUSCULAR
Status: DISCONTINUED | OUTPATIENT
Start: 2017-01-01 | End: 2017-08-25 | Stop reason: HOSPADM

## 2017-01-01 RX ORDER — POTASSIUM CHLORIDE 750 MG/1
10 CAPSULE, EXTENDED RELEASE ORAL DAILY
Status: DISCONTINUED | OUTPATIENT
Start: 2017-01-01 | End: 2017-01-01 | Stop reason: HOSPADM

## 2017-01-01 RX ORDER — ACETAMINOPHEN 325 MG/1
650 TABLET ORAL EVERY 4 HOURS PRN
Status: DISCONTINUED | OUTPATIENT
Start: 2017-01-01 | End: 2017-01-01 | Stop reason: SDUPTHER

## 2017-01-01 RX ORDER — MEMANTINE HYDROCHLORIDE 5 MG/1
5 TABLET ORAL NIGHTLY
Status: DISCONTINUED | OUTPATIENT
Start: 2017-01-01 | End: 2017-01-01 | Stop reason: HOSPADM

## 2017-01-01 RX ORDER — FAMOTIDINE 10 MG/ML
10 INJECTION, SOLUTION INTRAVENOUS EVERY 12 HOURS SCHEDULED
Status: DISCONTINUED | OUTPATIENT
Start: 2017-01-01 | End: 2017-01-01 | Stop reason: HOSPADM

## 2017-01-01 RX ORDER — ONDANSETRON 4 MG/1
4 TABLET, ORALLY DISINTEGRATING ORAL EVERY 6 HOURS PRN
Status: DISCONTINUED | OUTPATIENT
Start: 2017-01-01 | End: 2017-01-01 | Stop reason: HOSPADM

## 2017-01-01 RX ORDER — SENNA AND DOCUSATE SODIUM 50; 8.6 MG/1; MG/1
2 TABLET, FILM COATED ORAL NIGHTLY
Status: DISCONTINUED | OUTPATIENT
Start: 2017-01-01 | End: 2017-01-01 | Stop reason: HOSPADM

## 2017-01-01 RX ORDER — SENNA AND DOCUSATE SODIUM 50; 8.6 MG/1; MG/1
2 TABLET, FILM COATED ORAL NIGHTLY
Status: DISCONTINUED | OUTPATIENT
Start: 2017-01-01 | End: 2017-01-01

## 2017-01-01 RX ORDER — OLANZAPINE 5 MG/1
5 TABLET ORAL
Status: DISCONTINUED | OUTPATIENT
Start: 2017-01-01 | End: 2017-01-01 | Stop reason: HOSPADM

## 2017-01-01 RX ORDER — DIVALPROEX SODIUM 125 MG/1
250 CAPSULE, COATED PELLETS ORAL 3 TIMES DAILY
Status: DISCONTINUED | OUTPATIENT
Start: 2017-01-01 | End: 2017-01-01

## 2017-01-01 RX ORDER — HALOPERIDOL 5 MG/ML
2 INJECTION INTRAMUSCULAR EVERY 6 HOURS PRN
Status: DISCONTINUED | OUTPATIENT
Start: 2017-01-01 | End: 2017-01-01

## 2017-01-01 RX ORDER — HALOPERIDOL 1 MG/1
2 TABLET ORAL EVERY 4 HOURS PRN
Status: DISCONTINUED | OUTPATIENT
Start: 2017-01-01 | End: 2017-08-25 | Stop reason: HOSPADM

## 2017-01-01 RX ORDER — SODIUM CHLORIDE 9 MG/ML
75 INJECTION, SOLUTION INTRAVENOUS CONTINUOUS
Status: DISCONTINUED | OUTPATIENT
Start: 2017-01-01 | End: 2017-01-01

## 2017-01-01 RX ORDER — MORPHINE SULFATE 100 MG/5ML
20 SOLUTION ORAL
Status: DISCONTINUED | OUTPATIENT
Start: 2017-01-01 | End: 2017-08-25 | Stop reason: HOSPADM

## 2017-01-01 RX ORDER — SENNOSIDES 8.6 MG
1 TABLET ORAL DAILY
COMMUNITY
End: 2017-01-01 | Stop reason: HOSPADM

## 2017-01-01 RX ORDER — OLANZAPINE 10 MG/1
10 INJECTION, POWDER, LYOPHILIZED, FOR SOLUTION INTRAMUSCULAR ONCE
Status: COMPLETED | OUTPATIENT
Start: 2017-01-01 | End: 2017-01-01

## 2017-01-01 RX ORDER — BISACODYL 10 MG
10 SUPPOSITORY, RECTAL RECTAL DAILY PRN
Status: DISCONTINUED | OUTPATIENT
Start: 2017-01-01 | End: 2017-01-01 | Stop reason: HOSPADM

## 2017-01-01 RX ORDER — QUETIAPINE FUMARATE 25 MG/1
12.5 TABLET, FILM COATED ORAL DAILY
COMMUNITY
End: 2017-01-01 | Stop reason: HOSPADM

## 2017-01-01 RX ORDER — ACETAMINOPHEN 325 MG/1
650 TABLET ORAL EVERY 4 HOURS PRN
Status: DISCONTINUED | OUTPATIENT
Start: 2017-01-01 | End: 2017-01-01 | Stop reason: HOSPADM

## 2017-01-01 RX ORDER — LORAZEPAM 2 MG/ML
1 CONCENTRATE ORAL
Status: DISCONTINUED | OUTPATIENT
Start: 2017-01-01 | End: 2017-08-25 | Stop reason: HOSPADM

## 2017-01-01 RX ORDER — LORAZEPAM 2 MG/ML
2 CONCENTRATE ORAL
Status: DISCONTINUED | OUTPATIENT
Start: 2017-01-01 | End: 2017-08-25 | Stop reason: HOSPADM

## 2017-01-01 RX ORDER — DIVALPROEX SODIUM 125 MG/1
500 CAPSULE, COATED PELLETS ORAL 2 TIMES DAILY
Status: DISCONTINUED | OUTPATIENT
Start: 2017-01-01 | End: 2017-01-01 | Stop reason: HOSPADM

## 2017-01-01 RX ORDER — BUDESONIDE AND FORMOTEROL FUMARATE DIHYDRATE 160; 4.5 UG/1; UG/1
1 AEROSOL RESPIRATORY (INHALATION)
Status: DISCONTINUED | OUTPATIENT
Start: 2017-01-01 | End: 2017-01-01

## 2017-01-01 RX ORDER — MIRTAZAPINE 15 MG/1
7.5 TABLET, FILM COATED ORAL NIGHTLY
Status: COMPLETED | OUTPATIENT
Start: 2017-01-01 | End: 2017-01-01

## 2017-01-01 RX ORDER — LORAZEPAM 2 MG/ML
2 INJECTION INTRAMUSCULAR
Status: DISCONTINUED | OUTPATIENT
Start: 2017-01-01 | End: 2017-01-01

## 2017-01-01 RX ORDER — IPRATROPIUM BROMIDE AND ALBUTEROL SULFATE 2.5; .5 MG/3ML; MG/3ML
3 SOLUTION RESPIRATORY (INHALATION) 4 TIMES DAILY PRN
Status: ON HOLD | COMMUNITY
End: 2017-01-01

## 2017-01-01 RX ORDER — FLUTICASONE PROPIONATE 50 MCG
1 SPRAY, SUSPENSION (ML) NASAL DAILY
Status: ON HOLD | COMMUNITY
End: 2017-01-01

## 2017-01-01 RX ORDER — CYCLOBENZAPRINE HCL 5 MG
5 TABLET ORAL DAILY
Status: DISPENSED | OUTPATIENT
Start: 2017-01-01 | End: 2017-01-01

## 2017-01-01 RX ORDER — ALUMINA, MAGNESIA, AND SIMETHICONE 2400; 2400; 240 MG/30ML; MG/30ML; MG/30ML
15 SUSPENSION ORAL EVERY 6 HOURS PRN
Status: DISCONTINUED | OUTPATIENT
Start: 2017-01-01 | End: 2017-01-01

## 2017-01-01 RX ORDER — LORAZEPAM 1 MG/1
1 TABLET ORAL
Status: DISCONTINUED | OUTPATIENT
Start: 2017-01-01 | End: 2017-08-25 | Stop reason: HOSPADM

## 2017-01-01 RX ORDER — ACETAMINOPHEN 500 MG
1000 TABLET ORAL ONCE
Status: COMPLETED | OUTPATIENT
Start: 2017-01-01 | End: 2017-01-01

## 2017-01-01 RX ORDER — LORAZEPAM 2 MG/ML
1 INJECTION INTRAMUSCULAR
Status: DISCONTINUED | OUTPATIENT
Start: 2017-01-01 | End: 2017-01-01

## 2017-01-01 RX ORDER — POLYETHYLENE GLYCOL 3350 17 G/17G
17 POWDER, FOR SOLUTION ORAL DAILY
Start: 2017-01-01

## 2017-01-01 RX ORDER — CLONIDINE HYDROCHLORIDE 0.1 MG/1
0.1 TABLET ORAL EVERY 4 HOURS PRN
Status: DISCONTINUED | OUTPATIENT
Start: 2017-01-01 | End: 2017-01-01 | Stop reason: HOSPADM

## 2017-01-01 RX ORDER — IPRATROPIUM BROMIDE AND ALBUTEROL SULFATE 2.5; .5 MG/3ML; MG/3ML
3 SOLUTION RESPIRATORY (INHALATION) EVERY 4 HOURS PRN
Status: DISCONTINUED | OUTPATIENT
Start: 2017-01-01 | End: 2017-08-25 | Stop reason: HOSPADM

## 2017-01-01 RX ORDER — QUETIAPINE FUMARATE 25 MG/1
50 TABLET, FILM COATED ORAL NIGHTLY
Status: ON HOLD | COMMUNITY
End: 2017-01-01

## 2017-01-01 RX ORDER — BUDESONIDE AND FORMOTEROL FUMARATE DIHYDRATE 160; 4.5 UG/1; UG/1
2 AEROSOL RESPIRATORY (INHALATION)
Status: DISCONTINUED | OUTPATIENT
Start: 2017-01-01 | End: 2017-01-01 | Stop reason: HOSPADM

## 2017-01-01 RX ORDER — ACETAMINOPHEN 160 MG/5ML
650 SOLUTION ORAL EVERY 4 HOURS PRN
Status: DISCONTINUED | OUTPATIENT
Start: 2017-01-01 | End: 2017-08-25 | Stop reason: HOSPADM

## 2017-01-01 RX ORDER — GUAIFENESIN 600 MG/1
600 TABLET, EXTENDED RELEASE ORAL 2 TIMES DAILY
Status: DISCONTINUED | OUTPATIENT
Start: 2017-01-01 | End: 2017-01-01 | Stop reason: SDUPTHER

## 2017-01-01 RX ORDER — BUDESONIDE AND FORMOTEROL FUMARATE DIHYDRATE 160; 4.5 UG/1; UG/1
2 AEROSOL RESPIRATORY (INHALATION)
Status: DISCONTINUED | OUTPATIENT
Start: 2017-01-01 | End: 2017-01-01

## 2017-01-01 RX ORDER — GLYCOPYRROLATE 0.2 MG/ML
0.8 INJECTION INTRAMUSCULAR; INTRAVENOUS
Status: DISCONTINUED | OUTPATIENT
Start: 2017-01-01 | End: 2017-08-25 | Stop reason: HOSPADM

## 2017-01-01 RX ORDER — NITROGLYCERIN 0.4 MG/1
0.4 TABLET SUBLINGUAL
Status: DISCONTINUED | OUTPATIENT
Start: 2017-01-01 | End: 2017-01-01 | Stop reason: HOSPADM

## 2017-01-01 RX ORDER — LORAZEPAM 2 MG/ML
0.5 CONCENTRATE ORAL
Status: DISCONTINUED | OUTPATIENT
Start: 2017-01-01 | End: 2017-08-25 | Stop reason: HOSPADM

## 2017-01-01 RX ORDER — IBUPROFEN 800 MG/1
800 TABLET ORAL 3 TIMES DAILY PRN
COMMUNITY
End: 2017-01-01 | Stop reason: HOSPADM

## 2017-01-01 RX ORDER — LORAZEPAM 2 MG/ML
1 INJECTION INTRAMUSCULAR
Status: DISCONTINUED | OUTPATIENT
Start: 2017-01-01 | End: 2017-08-25 | Stop reason: HOSPADM

## 2017-01-01 RX ORDER — HALOPERIDOL 2 MG/ML
1 SOLUTION ORAL EVERY 4 HOURS PRN
Status: DISCONTINUED | OUTPATIENT
Start: 2017-01-01 | End: 2017-08-25 | Stop reason: HOSPADM

## 2017-01-01 RX ORDER — SCOLOPAMINE TRANSDERMAL SYSTEM 1 MG/1
1 PATCH, EXTENDED RELEASE TRANSDERMAL
Status: DISCONTINUED | OUTPATIENT
Start: 2017-01-01 | End: 2017-01-01

## 2017-01-01 RX ORDER — CHOLECALCIFEROL (VITAMIN D3) 125 MCG
1000 CAPSULE ORAL DAILY
Status: DISCONTINUED | OUTPATIENT
Start: 2017-01-01 | End: 2017-01-01 | Stop reason: HOSPADM

## 2017-01-01 RX ORDER — SENNA AND DOCUSATE SODIUM 50; 8.6 MG/1; MG/1
2 TABLET, FILM COATED ORAL NIGHTLY
Status: COMPLETED | OUTPATIENT
Start: 2017-01-01 | End: 2017-01-01

## 2017-01-01 RX ORDER — MONTELUKAST SODIUM 10 MG/1
10 TABLET ORAL NIGHTLY
Status: DISCONTINUED | OUTPATIENT
Start: 2017-01-01 | End: 2017-01-01 | Stop reason: HOSPADM

## 2017-01-01 RX ORDER — IPRATROPIUM BROMIDE AND ALBUTEROL SULFATE 2.5; .5 MG/3ML; MG/3ML
3 SOLUTION RESPIRATORY (INHALATION) 4 TIMES DAILY PRN
Status: DISCONTINUED | OUTPATIENT
Start: 2017-01-01 | End: 2017-01-01 | Stop reason: HOSPADM

## 2017-01-01 RX ORDER — POTASSIUM CHLORIDE 750 MG/1
10 CAPSULE, EXTENDED RELEASE ORAL DAILY
Status: ON HOLD | COMMUNITY
End: 2017-01-01

## 2017-01-01 RX ORDER — ASPIRIN 81 MG/1
81 TABLET ORAL DAILY
Status: ON HOLD
Start: 2017-01-01 | End: 2017-01-01

## 2017-01-01 RX ORDER — QUETIAPINE FUMARATE 25 MG/1
25 TABLET, FILM COATED ORAL DAILY
Status: DISCONTINUED | OUTPATIENT
Start: 2017-01-01 | End: 2017-01-01

## 2017-01-01 RX ORDER — MORPHINE SULFATE 10 MG/ML
6 INJECTION INTRAMUSCULAR; INTRAVENOUS; SUBCUTANEOUS
Status: DISCONTINUED | OUTPATIENT
Start: 2017-01-01 | End: 2017-08-25 | Stop reason: HOSPADM

## 2017-01-01 RX ORDER — FLUTICASONE PROPIONATE 50 MCG
1 SPRAY, SUSPENSION (ML) NASAL DAILY
Status: DISCONTINUED | OUTPATIENT
Start: 2017-01-01 | End: 2017-01-01

## 2017-01-01 RX ORDER — DOXYCYCLINE 100 MG/1
100 CAPSULE ORAL EVERY 12 HOURS SCHEDULED
Status: DISCONTINUED | OUTPATIENT
Start: 2017-01-01 | End: 2017-01-01

## 2017-01-01 RX ORDER — ASPIRIN 81 MG/1
81 TABLET ORAL DAILY
Status: DISCONTINUED | OUTPATIENT
Start: 2017-01-01 | End: 2017-01-01 | Stop reason: HOSPADM

## 2017-01-01 RX ORDER — LORAZEPAM 2 MG/ML
2 CONCENTRATE ORAL
Status: DISCONTINUED | OUTPATIENT
Start: 2017-01-01 | End: 2017-01-01

## 2017-01-01 RX ORDER — ACETAMINOPHEN 650 MG/1
650 SUPPOSITORY RECTAL EVERY 4 HOURS PRN
Status: DISCONTINUED | OUTPATIENT
Start: 2017-01-01 | End: 2017-08-25 | Stop reason: HOSPADM

## 2017-01-01 RX ORDER — CHOLECALCIFEROL (VITAMIN D3) 125 MCG
1000 CAPSULE ORAL DAILY
Status: COMPLETED | OUTPATIENT
Start: 2017-01-01 | End: 2017-01-01

## 2017-01-01 RX ORDER — QUETIAPINE FUMARATE 50 MG/1
50 TABLET, FILM COATED ORAL NIGHTLY
Status: DISCONTINUED | OUTPATIENT
Start: 2017-01-01 | End: 2017-01-01 | Stop reason: HOSPADM

## 2017-01-01 RX ORDER — MORPHINE SULFATE 2 MG/ML
2 INJECTION, SOLUTION INTRAMUSCULAR; INTRAVENOUS
Status: DISCONTINUED | OUTPATIENT
Start: 2017-01-01 | End: 2017-08-25 | Stop reason: HOSPADM

## 2017-01-01 RX ORDER — ZIPRASIDONE MESYLATE 20 MG/ML
20 INJECTION, POWDER, LYOPHILIZED, FOR SOLUTION INTRAMUSCULAR EVERY 8 HOURS PRN
Status: DISCONTINUED | OUTPATIENT
Start: 2017-01-01 | End: 2017-01-01

## 2017-01-01 RX ORDER — MIRTAZAPINE 15 MG/1
15 TABLET, FILM COATED ORAL NIGHTLY
Status: DISCONTINUED | OUTPATIENT
Start: 2017-01-01 | End: 2017-01-01 | Stop reason: HOSPADM

## 2017-01-01 RX ORDER — LORAZEPAM 2 MG/ML
1 CONCENTRATE ORAL
Status: DISCONTINUED | OUTPATIENT
Start: 2017-01-01 | End: 2017-01-01

## 2017-01-01 RX ORDER — BUDESONIDE AND FORMOTEROL FUMARATE DIHYDRATE 160; 4.5 UG/1; UG/1
2 AEROSOL RESPIRATORY (INHALATION)
Status: DISPENSED | OUTPATIENT
Start: 2017-01-01 | End: 2017-01-01

## 2017-01-01 RX ORDER — GUAIFENESIN 600 MG/1
600 TABLET, EXTENDED RELEASE ORAL 2 TIMES DAILY
Status: DISPENSED | OUTPATIENT
Start: 2017-01-01 | End: 2017-01-01

## 2017-01-01 RX ORDER — BUDESONIDE AND FORMOTEROL FUMARATE DIHYDRATE 160; 4.5 UG/1; UG/1
2 AEROSOL RESPIRATORY (INHALATION)
Status: CANCELLED | OUTPATIENT
Start: 2017-01-01 | End: 2017-01-01

## 2017-01-01 RX ORDER — POTASSIUM CHLORIDE 750 MG/1
10 CAPSULE, EXTENDED RELEASE ORAL DAILY
Status: COMPLETED | OUTPATIENT
Start: 2017-01-01 | End: 2017-01-01

## 2017-01-01 RX ORDER — GLYCOPYRROLATE 0.2 MG/ML
0.2 INJECTION INTRAMUSCULAR; INTRAVENOUS
Status: DISCONTINUED | OUTPATIENT
Start: 2017-01-01 | End: 2017-01-01

## 2017-01-01 RX ORDER — CHOLECALCIFEROL (VITAMIN D3) 125 MCG
6 CAPSULE ORAL NIGHTLY
Status: ON HOLD | COMMUNITY
End: 2017-01-01

## 2017-01-01 RX ORDER — OLANZAPINE 10 MG/1
10 INJECTION, POWDER, LYOPHILIZED, FOR SOLUTION INTRAMUSCULAR EVERY 8 HOURS PRN
Status: COMPLETED | OUTPATIENT
Start: 2017-01-01 | End: 2017-01-01

## 2017-01-01 RX ORDER — OLANZAPINE 5 MG/1
5 TABLET ORAL DAILY
Status: DISCONTINUED | OUTPATIENT
Start: 2017-01-01 | End: 2017-01-01 | Stop reason: HOSPADM

## 2017-01-01 RX ORDER — LORAZEPAM 2 MG/ML
2 INJECTION INTRAMUSCULAR
Status: DISCONTINUED | OUTPATIENT
Start: 2017-01-01 | End: 2017-08-25 | Stop reason: HOSPADM

## 2017-01-01 RX ORDER — POTASSIUM CHLORIDE 750 MG/1
20 CAPSULE, EXTENDED RELEASE ORAL ONCE
Status: COMPLETED | OUTPATIENT
Start: 2017-01-01 | End: 2017-01-01

## 2017-01-01 RX ORDER — MIRTAZAPINE 15 MG/1
7.5 TABLET, FILM COATED ORAL NIGHTLY
Status: DISCONTINUED | OUTPATIENT
Start: 2017-01-01 | End: 2017-01-01

## 2017-01-01 RX ORDER — FLUTICASONE PROPIONATE 50 MCG
1 SPRAY, SUSPENSION (ML) NASAL DAILY
Status: DISCONTINUED | OUTPATIENT
Start: 2017-01-01 | End: 2017-01-01 | Stop reason: HOSPADM

## 2017-01-01 RX ORDER — MORPHINE SULFATE 100 MG/5ML
10 SOLUTION ORAL
Status: DISCONTINUED | OUTPATIENT
Start: 2017-01-01 | End: 2017-08-25 | Stop reason: HOSPADM

## 2017-01-01 RX ORDER — GUAIFENESIN 600 MG/1
600 TABLET, EXTENDED RELEASE ORAL 2 TIMES DAILY
Status: DISCONTINUED | OUTPATIENT
Start: 2017-01-01 | End: 2017-01-01 | Stop reason: HOSPADM

## 2017-01-01 RX ORDER — CYCLOBENZAPRINE HCL 5 MG
5 TABLET ORAL DAILY
Status: DISCONTINUED | OUTPATIENT
Start: 2017-01-01 | End: 2017-01-01 | Stop reason: HOSPADM

## 2017-01-01 RX ORDER — QUETIAPINE FUMARATE 25 MG/1
25 TABLET, FILM COATED ORAL DAILY
Status: ON HOLD | COMMUNITY
End: 2017-01-01

## 2017-01-01 RX ORDER — POLYETHYLENE GLYCOL 3350 17 G/17G
17 POWDER, FOR SOLUTION ORAL DAILY
Status: COMPLETED | OUTPATIENT
Start: 2017-01-01 | End: 2017-01-01

## 2017-01-01 RX ORDER — ACETAMINOPHEN 325 MG/1
650 TABLET ORAL EVERY 4 HOURS PRN
Status: DISCONTINUED | OUTPATIENT
Start: 2017-01-01 | End: 2017-08-25 | Stop reason: HOSPADM

## 2017-01-01 RX ORDER — SCOLOPAMINE TRANSDERMAL SYSTEM 1 MG/1
2 PATCH, EXTENDED RELEASE TRANSDERMAL
Status: DISCONTINUED | OUTPATIENT
Start: 2017-01-01 | End: 2017-08-25 | Stop reason: HOSPADM

## 2017-01-01 RX ORDER — SENNA AND DOCUSATE SODIUM 50; 8.6 MG/1; MG/1
2 TABLET, FILM COATED ORAL NIGHTLY
Start: 2017-01-01

## 2017-01-01 RX ORDER — POLYETHYLENE GLYCOL 3350 17 G/17G
17 POWDER, FOR SOLUTION ORAL DAILY
Status: DISCONTINUED | OUTPATIENT
Start: 2017-01-01 | End: 2017-01-01 | Stop reason: HOSPADM

## 2017-01-01 RX ORDER — OLANZAPINE 10 MG/1
10 TABLET ORAL NIGHTLY
Status: DISCONTINUED | OUTPATIENT
Start: 2017-01-01 | End: 2017-01-01 | Stop reason: HOSPADM

## 2017-01-01 RX ORDER — OLANZAPINE 5 MG/1
10 TABLET, ORALLY DISINTEGRATING ORAL ONCE
Status: COMPLETED | OUTPATIENT
Start: 2017-01-01 | End: 2017-01-01

## 2017-01-01 RX ORDER — HALOPERIDOL 2 MG/1
2 TABLET ORAL 4 TIMES DAILY PRN
Status: ON HOLD | COMMUNITY
End: 2017-01-01

## 2017-01-01 RX ORDER — LORAZEPAM 1 MG/1
1 TABLET ORAL NIGHTLY
Status: DISCONTINUED | OUTPATIENT
Start: 2017-01-01 | End: 2017-01-01 | Stop reason: HOSPADM

## 2017-01-01 RX ORDER — POLYETHYLENE GLYCOL 3350 17 G/17G
17 POWDER, FOR SOLUTION ORAL DAILY
Status: DISCONTINUED | OUTPATIENT
Start: 2017-01-01 | End: 2017-01-01

## 2017-01-01 RX ORDER — OLANZAPINE 5 MG/1
10 TABLET, ORALLY DISINTEGRATING ORAL EVERY 8 HOURS PRN
Status: DISCONTINUED | OUTPATIENT
Start: 2017-01-01 | End: 2017-01-01

## 2017-01-01 RX ORDER — ONDANSETRON 2 MG/ML
4 INJECTION INTRAMUSCULAR; INTRAVENOUS EVERY 6 HOURS PRN
Status: DISCONTINUED | OUTPATIENT
Start: 2017-01-01 | End: 2017-01-01 | Stop reason: HOSPADM

## 2017-01-01 RX ORDER — MIRTAZAPINE 15 MG/1
7.5 TABLET, FILM COATED ORAL NIGHTLY
COMMUNITY

## 2017-01-01 RX ORDER — DIPHENOXYLATE HYDROCHLORIDE AND ATROPINE SULFATE 2.5; .025 MG/1; MG/1
1 TABLET ORAL
Status: DISCONTINUED | OUTPATIENT
Start: 2017-01-01 | End: 2017-08-25 | Stop reason: HOSPADM

## 2017-01-01 RX ORDER — MONTELUKAST SODIUM 10 MG/1
10 TABLET ORAL NIGHTLY
Status: COMPLETED | OUTPATIENT
Start: 2017-01-01 | End: 2017-01-01

## 2017-01-01 RX ORDER — DOXYCYCLINE 100 MG/1
100 CAPSULE ORAL EVERY 12 HOURS SCHEDULED
Status: COMPLETED | OUTPATIENT
Start: 2017-01-01 | End: 2017-01-01

## 2017-01-01 RX ORDER — LORAZEPAM 0.5 MG/1
0.5 TABLET ORAL
Status: DISCONTINUED | OUTPATIENT
Start: 2017-01-01 | End: 2017-08-25 | Stop reason: HOSPADM

## 2017-01-01 RX ORDER — FELODIPINE 10 MG/1
10 TABLET, EXTENDED RELEASE ORAL NIGHTLY
Status: COMPLETED | OUTPATIENT
Start: 2017-01-01 | End: 2017-01-01

## 2017-01-01 RX ORDER — CEFTRIAXONE SODIUM 1 G/50ML
1 INJECTION, SOLUTION INTRAVENOUS ONCE
Status: COMPLETED | OUTPATIENT
Start: 2017-01-01 | End: 2017-01-01

## 2017-01-01 RX ORDER — ACETAMINOPHEN 325 MG/1
650 TABLET ORAL EVERY 6 HOURS PRN
Status: DISCONTINUED | OUTPATIENT
Start: 2017-01-01 | End: 2017-01-01 | Stop reason: HOSPADM

## 2017-01-01 RX ORDER — GUAIFENESIN 600 MG/1
600 TABLET, EXTENDED RELEASE ORAL 2 TIMES DAILY
Status: ON HOLD | COMMUNITY
End: 2017-01-01

## 2017-01-01 RX ORDER — GLYCOPYRROLATE 0.2 MG/ML
0.4 INJECTION INTRAMUSCULAR; INTRAVENOUS
Status: DISCONTINUED | OUTPATIENT
Start: 2017-01-01 | End: 2017-08-25 | Stop reason: HOSPADM

## 2017-01-01 RX ORDER — HALOPERIDOL 5 MG/ML
5 INJECTION INTRAMUSCULAR EVERY 4 HOURS PRN
Status: DISCONTINUED | OUTPATIENT
Start: 2017-01-01 | End: 2017-08-25 | Stop reason: HOSPADM

## 2017-01-01 RX ORDER — SODIUM CHLORIDE 0.9 % (FLUSH) 0.9 %
1-10 SYRINGE (ML) INJECTION AS NEEDED
Status: DISCONTINUED | OUTPATIENT
Start: 2017-01-01 | End: 2017-01-01 | Stop reason: HOSPADM

## 2017-01-01 RX ORDER — SENNOSIDES 8.6 MG
1 TABLET ORAL DAILY
Status: DISCONTINUED | OUTPATIENT
Start: 2017-01-01 | End: 2017-01-01

## 2017-01-01 RX ORDER — HYDRALAZINE HYDROCHLORIDE 25 MG/1
75 TABLET, FILM COATED ORAL 3 TIMES DAILY
Status: ON HOLD | COMMUNITY
End: 2017-01-01

## 2017-01-01 RX ORDER — MONTELUKAST SODIUM 10 MG/1
10 TABLET ORAL NIGHTLY
Status: ON HOLD | COMMUNITY
End: 2017-01-01

## 2017-01-01 RX ORDER — DOXYCYCLINE 100 MG/1
100 CAPSULE ORAL EVERY 12 HOURS SCHEDULED
Status: DISCONTINUED | OUTPATIENT
Start: 2017-01-01 | End: 2017-01-01 | Stop reason: HOSPADM

## 2017-01-01 RX ORDER — FELODIPINE 10 MG/1
10 TABLET, EXTENDED RELEASE ORAL EVERY 12 HOURS SCHEDULED
Status: DISCONTINUED | OUTPATIENT
Start: 2017-01-01 | End: 2017-01-01 | Stop reason: HOSPADM

## 2017-01-01 RX ORDER — LORAZEPAM 1 MG/1
2 TABLET ORAL
Status: DISCONTINUED | OUTPATIENT
Start: 2017-01-01 | End: 2017-08-25 | Stop reason: HOSPADM

## 2017-01-01 RX ORDER — LANOLIN ALCOHOL/MO/W.PET/CERES
1000 CREAM (GRAM) TOPICAL DAILY
Status: ON HOLD | COMMUNITY
End: 2017-01-01

## 2017-01-01 RX ORDER — HYDRALAZINE HYDROCHLORIDE 50 MG/1
50 TABLET, FILM COATED ORAL EVERY 8 HOURS SCHEDULED
Status: DISCONTINUED | OUTPATIENT
Start: 2017-01-01 | End: 2017-01-01 | Stop reason: HOSPADM

## 2017-01-01 RX ORDER — OLANZAPINE 5 MG/1
5 TABLET ORAL 2 TIMES DAILY
Status: DISCONTINUED | OUTPATIENT
Start: 2017-01-01 | End: 2017-01-01

## 2017-01-01 RX ORDER — FELODIPINE 10 MG/1
10 TABLET, EXTENDED RELEASE ORAL 2 TIMES DAILY
Status: DISCONTINUED | OUTPATIENT
Start: 2017-01-01 | End: 2017-01-01 | Stop reason: HOSPADM

## 2017-01-01 RX ORDER — MEMANTINE HYDROCHLORIDE 5 MG/1
5 TABLET ORAL NIGHTLY
Status: DISCONTINUED | OUTPATIENT
Start: 2017-01-01 | End: 2017-01-01

## 2017-01-01 RX ORDER — DIVALPROEX SODIUM 250 MG/1
250 TABLET, DELAYED RELEASE ORAL 4 TIMES DAILY
Status: DISCONTINUED | OUTPATIENT
Start: 2017-01-01 | End: 2017-01-01 | Stop reason: HOSPADM

## 2017-01-01 RX ORDER — ASPIRIN 81 MG/1
81 TABLET ORAL DAILY
Status: COMPLETED | OUTPATIENT
Start: 2017-01-01 | End: 2017-01-01

## 2017-01-01 RX ORDER — FELODIPINE 10 MG/1
10 TABLET, EXTENDED RELEASE ORAL 2 TIMES DAILY
Status: ON HOLD | COMMUNITY
End: 2017-01-01

## 2017-01-01 RX ORDER — HALOPERIDOL 1 MG/1
3 TABLET ORAL NIGHTLY
Status: ON HOLD | COMMUNITY
End: 2017-01-01

## 2017-01-01 RX ORDER — HALOPERIDOL 2 MG/1
2 TABLET ORAL EVERY 8 HOURS PRN
Status: DISCONTINUED | OUTPATIENT
Start: 2017-01-01 | End: 2017-01-01 | Stop reason: HOSPADM

## 2017-01-01 RX ORDER — MEMANTINE HYDROCHLORIDE 5 MG/1
5 TABLET ORAL NIGHTLY
Status: ON HOLD | COMMUNITY
End: 2017-01-01 | Stop reason: SDUPTHER

## 2017-01-01 RX ADMIN — OLANZAPINE 10 MG: 10 TABLET, FILM COATED ORAL at 20:23

## 2017-01-01 RX ADMIN — FELODIPINE 10 MG: 10 TABLET, FILM COATED, EXTENDED RELEASE ORAL at 08:49

## 2017-01-01 RX ADMIN — MEMANTINE HYDROCHLORIDE 5 MG: 5 TABLET, FILM COATED ORAL at 20:11

## 2017-01-01 RX ADMIN — LORAZEPAM 2 MG: 2 INJECTION INTRAMUSCULAR; INTRAVENOUS at 07:57

## 2017-01-01 RX ADMIN — BUDESONIDE AND FORMOTEROL FUMARATE DIHYDRATE 2 PUFF: 160; 4.5 AEROSOL RESPIRATORY (INHALATION) at 20:00

## 2017-01-01 RX ADMIN — CYCLOBENZAPRINE HYDROCHLORIDE 5 MG: 5 TABLET, FILM COATED ORAL at 20:50

## 2017-01-01 RX ADMIN — OLANZAPINE 5 MG: 5 TABLET, FILM COATED ORAL at 17:41

## 2017-01-01 RX ADMIN — HALOPERIDOL 2 MG: 2 SOLUTION ORAL at 19:13

## 2017-01-01 RX ADMIN — ASPIRIN 81 MG: 81 TABLET ORAL at 13:03

## 2017-01-01 RX ADMIN — DIVALPROEX SODIUM 250 MG: 125 CAPSULE, COATED PELLETS ORAL at 08:55

## 2017-01-01 RX ADMIN — MIRTAZAPINE 7.5 MG: 15 TABLET, FILM COATED ORAL at 20:58

## 2017-01-01 RX ADMIN — LORAZEPAM 2 MG: 2 INJECTION INTRAMUSCULAR; INTRAVENOUS at 05:33

## 2017-01-01 RX ADMIN — GLYCOPYRROLATE 0.8 MG: 0.2 INJECTION, SOLUTION INTRAMUSCULAR; INTRAVENOUS at 14:01

## 2017-01-01 RX ADMIN — POLYETHYLENE GLYCOL 3350 17 G: 17 POWDER, FOR SOLUTION ORAL at 12:07

## 2017-01-01 RX ADMIN — OLANZAPINE 10 MG: 10 TABLET, FILM COATED ORAL at 20:50

## 2017-01-01 RX ADMIN — MORPHINE SULFATE 2 MG: 2 INJECTION, SOLUTION INTRAMUSCULAR; INTRAVENOUS at 00:48

## 2017-01-01 RX ADMIN — FELODIPINE 10 MG: 10 TABLET, FILM COATED, EXTENDED RELEASE ORAL at 17:13

## 2017-01-01 RX ADMIN — HYDRALAZINE HYDROCHLORIDE 50 MG: 50 TABLET ORAL at 20:41

## 2017-01-01 RX ADMIN — OLANZAPINE 10 MG: 10 TABLET, FILM COATED ORAL at 00:02

## 2017-01-01 RX ADMIN — GLYCOPYRROLATE 0.4 MG: 0.2 INJECTION, SOLUTION INTRAMUSCULAR; INTRAVENOUS at 21:05

## 2017-01-01 RX ADMIN — CYCLOBENZAPRINE HYDROCHLORIDE 5 MG: 5 TABLET, FILM COATED ORAL at 05:10

## 2017-01-01 RX ADMIN — BUDESONIDE AND FORMOTEROL FUMARATE DIHYDRATE 2 PUFF: 160; 4.5 AEROSOL RESPIRATORY (INHALATION) at 07:08

## 2017-01-01 RX ADMIN — LORAZEPAM 1 MG: 2 INJECTION INTRAMUSCULAR; INTRAVENOUS at 03:26

## 2017-01-01 RX ADMIN — FAMOTIDINE 10 MG: 10 INJECTION, SOLUTION INTRAVENOUS at 09:51

## 2017-01-01 RX ADMIN — LORAZEPAM 2 MG: 2 INJECTION INTRAMUSCULAR; INTRAVENOUS at 22:22

## 2017-01-01 RX ADMIN — MORPHINE SULFATE 4 MG: 4 INJECTION, SOLUTION INTRAMUSCULAR; INTRAVENOUS at 04:55

## 2017-01-01 RX ADMIN — SODIUM CHLORIDE 1000 ML: 9 INJECTION, SOLUTION INTRAVENOUS at 16:24

## 2017-01-01 RX ADMIN — MEMANTINE HYDROCHLORIDE 5 MG: 5 TABLET, FILM COATED ORAL at 19:50

## 2017-01-01 RX ADMIN — OLANZAPINE 5 MG: 5 TABLET, FILM COATED ORAL at 11:25

## 2017-01-01 RX ADMIN — Medication 5 MG: at 20:50

## 2017-01-01 RX ADMIN — FLUTICASONE PROPIONATE 1 SPRAY: 50 SPRAY, METERED NASAL at 10:45

## 2017-01-01 RX ADMIN — POTASSIUM CHLORIDE 10 MEQ: 750 CAPSULE, EXTENDED RELEASE ORAL at 12:22

## 2017-01-01 RX ADMIN — HYDRALAZINE HYDROCHLORIDE 50 MG: 50 TABLET ORAL at 05:39

## 2017-01-01 RX ADMIN — LORAZEPAM 1 MG: 1 TABLET ORAL at 21:08

## 2017-01-01 RX ADMIN — POTASSIUM CHLORIDE 10 MEQ: 750 CAPSULE, EXTENDED RELEASE ORAL at 10:00

## 2017-01-01 RX ADMIN — OLANZAPINE 5 MG: 5 TABLET, FILM COATED ORAL at 10:40

## 2017-01-01 RX ADMIN — MONTELUKAST 10 MG: 10 TABLET, FILM COATED ORAL at 20:50

## 2017-01-01 RX ADMIN — HYDRALAZINE HYDROCHLORIDE 50 MG: 50 TABLET ORAL at 20:18

## 2017-01-01 RX ADMIN — LORAZEPAM 2 MG: 2 INJECTION INTRAMUSCULAR; INTRAVENOUS at 08:52

## 2017-01-01 RX ADMIN — DIVALPROEX SODIUM 250 MG: 125 CAPSULE, COATED PELLETS ORAL at 15:32

## 2017-01-01 RX ADMIN — HYDRALAZINE HYDROCHLORIDE 75 MG: 50 TABLET, FILM COATED ORAL at 22:17

## 2017-01-01 RX ADMIN — POTASSIUM CHLORIDE 10 MEQ: 750 CAPSULE, EXTENDED RELEASE ORAL at 09:55

## 2017-01-01 RX ADMIN — LORAZEPAM 2 MG: 2 INJECTION INTRAMUSCULAR; INTRAVENOUS at 04:55

## 2017-01-01 RX ADMIN — BUDESONIDE AND FORMOTEROL FUMARATE DIHYDRATE 2 PUFF: 160; 4.5 AEROSOL RESPIRATORY (INHALATION) at 08:39

## 2017-01-01 RX ADMIN — POTASSIUM CHLORIDE 10 MEQ: 750 CAPSULE, EXTENDED RELEASE ORAL at 09:07

## 2017-01-01 RX ADMIN — GUAIFENESIN 600 MG: 600 TABLET, EXTENDED RELEASE ORAL at 10:40

## 2017-01-01 RX ADMIN — POLYETHYLENE GLYCOL 3350 17 G: 17 POWDER, FOR SOLUTION ORAL at 10:38

## 2017-01-01 RX ADMIN — MEMANTINE HYDROCHLORIDE 5 MG: 5 TABLET, FILM COATED ORAL at 22:18

## 2017-01-01 RX ADMIN — OLANZAPINE 5 MG: 5 TABLET, FILM COATED ORAL at 10:09

## 2017-01-01 RX ADMIN — GLYCOPYRROLATE 0.8 MG: 0.2 INJECTION, SOLUTION INTRAMUSCULAR; INTRAVENOUS at 04:55

## 2017-01-01 RX ADMIN — SENNOSIDES 8.6 MG: 8.6 TABLET, FILM COATED ORAL at 09:08

## 2017-01-01 RX ADMIN — LORAZEPAM 1 MG: 2 INJECTION INTRAMUSCULAR; INTRAVENOUS at 11:52

## 2017-01-01 RX ADMIN — BUDESONIDE AND FORMOTEROL FUMARATE DIHYDRATE 2 PUFF: 160; 4.5 AEROSOL RESPIRATORY (INHALATION) at 20:13

## 2017-01-01 RX ADMIN — GLYCOPYRROLATE 0.4 MG: 0.2 INJECTION, SOLUTION INTRAMUSCULAR; INTRAVENOUS at 17:21

## 2017-01-01 RX ADMIN — POTASSIUM CHLORIDE 10 MEQ: 750 CAPSULE, EXTENDED RELEASE ORAL at 10:09

## 2017-01-01 RX ADMIN — MIRTAZAPINE 7.5 MG: 15 TABLET, FILM COATED ORAL at 20:24

## 2017-01-01 RX ADMIN — LORAZEPAM 1 MG: 1 TABLET ORAL at 19:49

## 2017-01-01 RX ADMIN — BUDESONIDE AND FORMOTEROL FUMARATE DIHYDRATE 1 PUFF: 160; 4.5 AEROSOL RESPIRATORY (INHALATION) at 10:21

## 2017-01-01 RX ADMIN — FLUTICASONE PROPIONATE 1 SPRAY: 50 SPRAY, METERED NASAL at 08:48

## 2017-01-01 RX ADMIN — DIVALPROEX SODIUM 250 MG: 250 TABLET, DELAYED RELEASE ORAL at 02:28

## 2017-01-01 RX ADMIN — POLYETHYLENE GLYCOL 3350 17 G: 17 POWDER, FOR SOLUTION ORAL at 10:00

## 2017-01-01 RX ADMIN — POLYETHYLENE GLYCOL 3350 17 G: 17 POWDER, FOR SOLUTION ORAL at 08:46

## 2017-01-01 RX ADMIN — DIVALPROEX SODIUM 500 MG: 125 CAPSULE, COATED PELLETS ORAL at 10:37

## 2017-01-01 RX ADMIN — OLANZAPINE 10 MG: 10 TABLET, FILM COATED ORAL at 20:17

## 2017-01-01 RX ADMIN — GUAIFENESIN 600 MG: 600 TABLET, EXTENDED RELEASE ORAL at 09:56

## 2017-01-01 RX ADMIN — GLYCOPYRROLATE 0.4 MG: 0.2 INJECTION, SOLUTION INTRAMUSCULAR; INTRAVENOUS at 12:24

## 2017-01-01 RX ADMIN — MORPHINE SULFATE 2 MG: 2 INJECTION, SOLUTION INTRAMUSCULAR; INTRAVENOUS at 16:32

## 2017-01-01 RX ADMIN — CYCLOBENZAPRINE HYDROCHLORIDE 5 MG: 5 TABLET, FILM COATED ORAL at 08:46

## 2017-01-01 RX ADMIN — CYANOCOBALAMIN TAB 500 MCG 1000 MCG: 500 TAB at 08:47

## 2017-01-01 RX ADMIN — MORPHINE SULFATE 4 MG: 4 INJECTION, SOLUTION INTRAMUSCULAR; INTRAVENOUS at 08:52

## 2017-01-01 RX ADMIN — OLANZAPINE 10 MG: 10 TABLET, FILM COATED ORAL at 21:18

## 2017-01-01 RX ADMIN — DIVALPROEX SODIUM 250 MG: 250 TABLET, DELAYED RELEASE ORAL at 20:57

## 2017-01-01 RX ADMIN — OLANZAPINE 5 MG: 5 TABLET, FILM COATED ORAL at 09:59

## 2017-01-01 RX ADMIN — GLYCOPYRROLATE 0.4 MG: 0.2 INJECTION, SOLUTION INTRAMUSCULAR; INTRAVENOUS at 16:32

## 2017-01-01 RX ADMIN — MONTELUKAST 10 MG: 10 TABLET, FILM COATED ORAL at 20:38

## 2017-01-01 RX ADMIN — ACETAMINOPHEN 650 MG: 325 TABLET ORAL at 05:28

## 2017-01-01 RX ADMIN — ASPIRIN 81 MG: 81 TABLET ORAL at 08:55

## 2017-01-01 RX ADMIN — GUAIFENESIN 600 MG: 600 TABLET, EXTENDED RELEASE ORAL at 17:12

## 2017-01-01 RX ADMIN — DIVALPROEX SODIUM 500 MG: 125 CAPSULE, COATED PELLETS ORAL at 20:38

## 2017-01-01 RX ADMIN — MEMANTINE HYDROCHLORIDE 5 MG: 5 TABLET, FILM COATED ORAL at 20:50

## 2017-01-01 RX ADMIN — GLYCOPYRROLATE 0.4 MG: 0.2 INJECTION, SOLUTION INTRAMUSCULAR; INTRAVENOUS at 02:58

## 2017-01-01 RX ADMIN — Medication 5 MG: at 00:01

## 2017-01-01 RX ADMIN — GLYCOPYRROLATE 0.8 MG: 0.2 INJECTION, SOLUTION INTRAMUSCULAR; INTRAVENOUS at 21:15

## 2017-01-01 RX ADMIN — Medication 5 MG: at 20:10

## 2017-01-01 RX ADMIN — OLANZAPINE 5 MG: 5 TABLET, FILM COATED ORAL at 17:26

## 2017-01-01 RX ADMIN — DIVALPROEX SODIUM 500 MG: 125 CAPSULE, COATED PELLETS ORAL at 10:08

## 2017-01-01 RX ADMIN — POLYETHYLENE GLYCOL 3350 17 G: 17 POWDER, FOR SOLUTION ORAL at 12:30

## 2017-01-01 RX ADMIN — LORAZEPAM 2 MG: 2 INJECTION INTRAMUSCULAR; INTRAVENOUS at 08:06

## 2017-01-01 RX ADMIN — DIVALPROEX SODIUM 250 MG: 125 CAPSULE, COATED PELLETS ORAL at 08:31

## 2017-01-01 RX ADMIN — FLUTICASONE PROPIONATE 1 SPRAY: 50 SPRAY, METERED NASAL at 09:53

## 2017-01-01 RX ADMIN — QUETIAPINE FUMARATE 50 MG: 50 TABLET, FILM COATED ORAL at 20:50

## 2017-01-01 RX ADMIN — DOXYCYCLINE 100 MG: 100 CAPSULE ORAL at 20:37

## 2017-01-01 RX ADMIN — DOCUSATE SODIUM -SENNOSIDES 2 TABLET: 50; 8.6 TABLET, COATED ORAL at 00:03

## 2017-01-01 RX ADMIN — HYDRALAZINE HYDROCHLORIDE 50 MG: 50 TABLET ORAL at 06:34

## 2017-01-01 RX ADMIN — GUAIFENESIN 600 MG: 600 TABLET, EXTENDED RELEASE ORAL at 17:41

## 2017-01-01 RX ADMIN — BUDESONIDE AND FORMOTEROL FUMARATE DIHYDRATE 2 PUFF: 160; 4.5 AEROSOL RESPIRATORY (INHALATION) at 21:07

## 2017-01-01 RX ADMIN — MONTELUKAST 10 MG: 10 TABLET, FILM COATED ORAL at 19:49

## 2017-01-01 RX ADMIN — HYDRALAZINE HYDROCHLORIDE 50 MG: 50 TABLET ORAL at 14:28

## 2017-01-01 RX ADMIN — BUDESONIDE AND FORMOTEROL FUMARATE DIHYDRATE 2 PUFF: 160; 4.5 AEROSOL RESPIRATORY (INHALATION) at 07:33

## 2017-01-01 RX ADMIN — POLYETHYLENE GLYCOL 3350 17 G: 17 POWDER, FOR SOLUTION ORAL at 10:09

## 2017-01-01 RX ADMIN — OLANZAPINE 10 MG: 10 TABLET, FILM COATED ORAL at 19:48

## 2017-01-01 RX ADMIN — CYCLOBENZAPRINE HYDROCHLORIDE 5 MG: 5 TABLET, FILM COATED ORAL at 09:08

## 2017-01-01 RX ADMIN — HYDRALAZINE HYDROCHLORIDE 50 MG: 50 TABLET ORAL at 05:28

## 2017-01-01 RX ADMIN — CEFTRIAXONE SODIUM 1 G: 1 INJECTION, SOLUTION INTRAVENOUS at 19:08

## 2017-01-01 RX ADMIN — CYCLOBENZAPRINE HYDROCHLORIDE 5 MG: 5 TABLET, FILM COATED ORAL at 08:48

## 2017-01-01 RX ADMIN — DOXYCYCLINE 100 MG: 100 CAPSULE ORAL at 20:49

## 2017-01-01 RX ADMIN — POTASSIUM CHLORIDE 10 MEQ: 750 CAPSULE, EXTENDED RELEASE ORAL at 09:53

## 2017-01-01 RX ADMIN — HYDRALAZINE HYDROCHLORIDE 50 MG: 50 TABLET ORAL at 00:01

## 2017-01-01 RX ADMIN — BUDESONIDE AND FORMOTEROL FUMARATE DIHYDRATE 2 PUFF: 160; 4.5 AEROSOL RESPIRATORY (INHALATION) at 07:40

## 2017-01-01 RX ADMIN — GUAIFENESIN 600 MG: 600 TABLET, EXTENDED RELEASE ORAL at 08:46

## 2017-01-01 RX ADMIN — FLUTICASONE PROPIONATE 1 SPRAY: 50 SPRAY, METERED NASAL at 12:57

## 2017-01-01 RX ADMIN — GLYCOPYRROLATE 0.4 MG: 0.2 INJECTION, SOLUTION INTRAMUSCULAR; INTRAVENOUS at 12:48

## 2017-01-01 RX ADMIN — DIVALPROEX SODIUM 250 MG: 125 CAPSULE, COATED PELLETS ORAL at 10:08

## 2017-01-01 RX ADMIN — MIRTAZAPINE 15 MG: 15 TABLET, FILM COATED ORAL at 21:03

## 2017-01-01 RX ADMIN — MEMANTINE HYDROCHLORIDE 5 MG: 5 TABLET, FILM COATED ORAL at 20:33

## 2017-01-01 RX ADMIN — DIVALPROEX SODIUM 250 MG: 125 CAPSULE, COATED PELLETS ORAL at 20:25

## 2017-01-01 RX ADMIN — DIVALPROEX SODIUM 500 MG: 125 CAPSULE, COATED PELLETS ORAL at 12:56

## 2017-01-01 RX ADMIN — ACETAMINOPHEN 650 MG: 325 TABLET ORAL at 17:28

## 2017-01-01 RX ADMIN — GUAIFENESIN 600 MG: 600 TABLET, EXTENDED RELEASE ORAL at 10:09

## 2017-01-01 RX ADMIN — DOCUSATE SODIUM -SENNOSIDES 2 TABLET: 50; 8.6 TABLET, COATED ORAL at 20:50

## 2017-01-01 RX ADMIN — ACETAMINOPHEN 650 MG: 325 TABLET ORAL at 10:53

## 2017-01-01 RX ADMIN — GUAIFENESIN 600 MG: 600 TABLET, EXTENDED RELEASE ORAL at 19:19

## 2017-01-01 RX ADMIN — MONTELUKAST 10 MG: 10 TABLET, FILM COATED ORAL at 20:23

## 2017-01-01 RX ADMIN — GUAIFENESIN 600 MG: 600 TABLET, EXTENDED RELEASE ORAL at 12:23

## 2017-01-01 RX ADMIN — MORPHINE SULFATE 4 MG: 4 INJECTION, SOLUTION INTRAMUSCULAR; INTRAVENOUS at 12:48

## 2017-01-01 RX ADMIN — POTASSIUM CHLORIDE 10 MEQ: 750 CAPSULE, EXTENDED RELEASE ORAL at 10:40

## 2017-01-01 RX ADMIN — MORPHINE SULFATE 4 MG: 4 INJECTION, SOLUTION INTRAMUSCULAR; INTRAVENOUS at 00:31

## 2017-01-01 RX ADMIN — SCOPOLAMINE 1 PATCH: 1 PATCH, EXTENDED RELEASE TRANSDERMAL at 14:01

## 2017-01-01 RX ADMIN — DOXYCYCLINE 100 MG: 100 CAPSULE ORAL at 20:25

## 2017-01-01 RX ADMIN — HYDRALAZINE HYDROCHLORIDE 50 MG: 50 TABLET ORAL at 14:23

## 2017-01-01 RX ADMIN — MEMANTINE HYDROCHLORIDE 5 MG: 5 TABLET, FILM COATED ORAL at 20:57

## 2017-01-01 RX ADMIN — MIRTAZAPINE 15 MG: 15 TABLET, FILM COATED ORAL at 20:38

## 2017-01-01 RX ADMIN — MORPHINE SULFATE 2 MG: 2 INJECTION, SOLUTION INTRAMUSCULAR; INTRAVENOUS at 15:40

## 2017-01-01 RX ADMIN — HYDRALAZINE HYDROCHLORIDE 50 MG: 50 TABLET ORAL at 06:55

## 2017-01-01 RX ADMIN — POLYETHYLENE GLYCOL (3350) 17 G: 17 POWDER, FOR SOLUTION ORAL at 20:58

## 2017-01-01 RX ADMIN — DEXTROMETHORPHAN HYDROBROMIDE AND QUINIDINE SULFATE 1 CAPSULE: 20; 10 CAPSULE, GELATIN COATED ORAL at 12:56

## 2017-01-01 RX ADMIN — LORAZEPAM 1 MG: 2 INJECTION INTRAMUSCULAR; INTRAVENOUS at 16:32

## 2017-01-01 RX ADMIN — FELODIPINE 10 MG: 10 TABLET, FILM COATED, EXTENDED RELEASE ORAL at 09:55

## 2017-01-01 RX ADMIN — MORPHINE SULFATE 2 MG: 2 INJECTION, SOLUTION INTRAMUSCULAR; INTRAVENOUS at 12:24

## 2017-01-01 RX ADMIN — BUDESONIDE AND FORMOTEROL FUMARATE DIHYDRATE 2 PUFF: 160; 4.5 AEROSOL RESPIRATORY (INHALATION) at 21:14

## 2017-01-01 RX ADMIN — OLANZAPINE 5 MG: 5 TABLET, FILM COATED ORAL at 08:31

## 2017-01-01 RX ADMIN — MEMANTINE HYDROCHLORIDE 5 MG: 5 TABLET, FILM COATED ORAL at 20:38

## 2017-01-01 RX ADMIN — ASPIRIN 81 MG: 81 TABLET ORAL at 10:08

## 2017-01-01 RX ADMIN — HALOPERIDOL 3 MG: 1 TABLET ORAL at 20:10

## 2017-01-01 RX ADMIN — MORPHINE SULFATE 4 MG: 4 INJECTION, SOLUTION INTRAMUSCULAR; INTRAVENOUS at 01:00

## 2017-01-01 RX ADMIN — HYDRALAZINE HYDROCHLORIDE 50 MG: 50 TABLET ORAL at 22:15

## 2017-01-01 RX ADMIN — OLANZAPINE 10 MG: 10 TABLET, FILM COATED ORAL at 20:36

## 2017-01-01 RX ADMIN — MEMANTINE HYDROCHLORIDE 5 MG: 5 TABLET, FILM COATED ORAL at 21:18

## 2017-01-01 RX ADMIN — LORAZEPAM 2 MG: 2 INJECTION INTRAMUSCULAR; INTRAVENOUS at 20:40

## 2017-01-01 RX ADMIN — GLYCOPYRROLATE 0.8 MG: 0.2 INJECTION, SOLUTION INTRAMUSCULAR; INTRAVENOUS at 05:18

## 2017-01-01 RX ADMIN — MORPHINE SULFATE 4 MG: 4 INJECTION, SOLUTION INTRAMUSCULAR; INTRAVENOUS at 16:39

## 2017-01-01 RX ADMIN — FELODIPINE 10 MG: 10 TABLET, FILM COATED, EXTENDED RELEASE ORAL at 17:12

## 2017-01-01 RX ADMIN — POTASSIUM CHLORIDE 10 MEQ: 750 CAPSULE, EXTENDED RELEASE ORAL at 09:20

## 2017-01-01 RX ADMIN — CYANOCOBALAMIN TAB 500 MCG 1000 MCG: 500 TAB at 09:08

## 2017-01-01 RX ADMIN — BUDESONIDE AND FORMOTEROL FUMARATE DIHYDRATE 2 PUFF: 160; 4.5 AEROSOL RESPIRATORY (INHALATION) at 07:50

## 2017-01-01 RX ADMIN — DIVALPROEX SODIUM 250 MG: 250 TABLET, DELAYED RELEASE ORAL at 17:48

## 2017-01-01 RX ADMIN — GUAIFENESIN 600 MG: 600 TABLET, EXTENDED RELEASE ORAL at 08:49

## 2017-01-01 RX ADMIN — MORPHINE SULFATE 2 MG: 2 INJECTION, SOLUTION INTRAMUSCULAR; INTRAVENOUS at 11:52

## 2017-01-01 RX ADMIN — DIVALPROEX SODIUM 500 MG: 125 CAPSULE, COATED PELLETS ORAL at 17:13

## 2017-01-01 RX ADMIN — MIRTAZAPINE 15 MG: 15 TABLET, FILM COATED ORAL at 19:48

## 2017-01-01 RX ADMIN — OLANZAPINE 5 MG: 5 TABLET, FILM COATED ORAL at 10:43

## 2017-01-01 RX ADMIN — MORPHINE SULFATE 4 MG: 4 INJECTION, SOLUTION INTRAMUSCULAR; INTRAVENOUS at 08:06

## 2017-01-01 RX ADMIN — DOXYCYCLINE 100 MG: 100 CAPSULE ORAL at 08:31

## 2017-01-01 RX ADMIN — LORAZEPAM 2 MG: 2 INJECTION INTRAMUSCULAR; INTRAVENOUS at 16:39

## 2017-01-01 RX ADMIN — DIVALPROEX SODIUM 250 MG: 125 CAPSULE, COATED PELLETS ORAL at 17:13

## 2017-01-01 RX ADMIN — OLANZAPINE 5 MG: 5 TABLET, FILM COATED ORAL at 12:23

## 2017-01-01 RX ADMIN — SCOPOLAMINE 1 PATCH: 1 PATCH, EXTENDED RELEASE TRANSDERMAL at 06:39

## 2017-01-01 RX ADMIN — HYDRALAZINE HYDROCHLORIDE 50 MG: 50 TABLET ORAL at 21:01

## 2017-01-01 RX ADMIN — HYDRALAZINE HYDROCHLORIDE 75 MG: 50 TABLET, FILM COATED ORAL at 14:54

## 2017-01-01 RX ADMIN — MORPHINE SULFATE 2 MG: 2 INJECTION, SOLUTION INTRAMUSCULAR; INTRAVENOUS at 20:47

## 2017-01-01 RX ADMIN — POLYETHYLENE GLYCOL 3350 17 G: 17 POWDER, FOR SOLUTION ORAL at 09:57

## 2017-01-01 RX ADMIN — DOXYCYCLINE 100 MG: 100 CAPSULE ORAL at 20:32

## 2017-01-01 RX ADMIN — OLANZAPINE 5 MG: 5 TABLET, FILM COATED ORAL at 18:00

## 2017-01-01 RX ADMIN — POLYETHYLENE GLYCOL 3350 17 G: 17 POWDER, FOR SOLUTION ORAL at 10:10

## 2017-01-01 RX ADMIN — MORPHINE SULFATE 2 MG: 2 INJECTION, SOLUTION INTRAMUSCULAR; INTRAVENOUS at 03:26

## 2017-01-01 RX ADMIN — FELODIPINE 10 MG: 10 TABLET, FILM COATED, EXTENDED RELEASE ORAL at 09:07

## 2017-01-01 RX ADMIN — HYDRALAZINE HYDROCHLORIDE 50 MG: 50 TABLET ORAL at 05:10

## 2017-01-01 RX ADMIN — ASPIRIN 81 MG: 81 TABLET ORAL at 09:20

## 2017-01-01 RX ADMIN — FAMOTIDINE 10 MG: 10 INJECTION, SOLUTION INTRAVENOUS at 09:08

## 2017-01-01 RX ADMIN — HYDRALAZINE HYDROCHLORIDE 50 MG: 50 TABLET ORAL at 21:09

## 2017-01-01 RX ADMIN — ACETAMINOPHEN 1000 MG: 500 TABLET ORAL at 16:13

## 2017-01-01 RX ADMIN — MORPHINE SULFATE 2 MG: 2 INJECTION, SOLUTION INTRAMUSCULAR; INTRAVENOUS at 08:09

## 2017-01-01 RX ADMIN — ENOXAPARIN SODIUM 40 MG: 40 INJECTION SUBCUTANEOUS at 09:51

## 2017-01-01 RX ADMIN — GLYCOPYRROLATE 0.4 MG: 0.2 INJECTION, SOLUTION INTRAMUSCULAR; INTRAVENOUS at 14:47

## 2017-01-01 RX ADMIN — CYANOCOBALAMIN TAB 500 MCG 1000 MCG: 500 TAB at 08:48

## 2017-01-01 RX ADMIN — GLYCOPYRROLATE 0.4 MG: 0.2 INJECTION, SOLUTION INTRAMUSCULAR; INTRAVENOUS at 12:50

## 2017-01-01 RX ADMIN — MIRTAZAPINE 15 MG: 15 TABLET, FILM COATED ORAL at 00:02

## 2017-01-01 RX ADMIN — POTASSIUM CHLORIDE 10 MEQ: 750 CAPSULE, EXTENDED RELEASE ORAL at 12:08

## 2017-01-01 RX ADMIN — GUAIFENESIN 600 MG: 600 TABLET, EXTENDED RELEASE ORAL at 12:56

## 2017-01-01 RX ADMIN — DEXTROMETHORPHAN HYDROBROMIDE AND QUINIDINE SULFATE 1 CAPSULE: 20; 10 CAPSULE, GELATIN COATED ORAL at 10:10

## 2017-01-01 RX ADMIN — ASPIRIN 81 MG: 81 TABLET ORAL at 19:19

## 2017-01-01 RX ADMIN — FELODIPINE 10 MG: 10 TABLET, FILM COATED, EXTENDED RELEASE ORAL at 09:52

## 2017-01-01 RX ADMIN — ACETAMINOPHEN 650 MG: 325 TABLET ORAL at 19:49

## 2017-01-01 RX ADMIN — CYCLOBENZAPRINE HYDROCHLORIDE 5 MG: 5 TABLET, FILM COATED ORAL at 19:48

## 2017-01-01 RX ADMIN — VANCOMYCIN HYDROCHLORIDE 750 MG: 750 INJECTION, POWDER, LYOPHILIZED, FOR SOLUTION INTRAVENOUS at 11:46

## 2017-01-01 RX ADMIN — LORAZEPAM 2 MG: 2 INJECTION INTRAMUSCULAR; INTRAVENOUS at 21:04

## 2017-01-01 RX ADMIN — OLANZAPINE 5 MG: 5 TABLET, FILM COATED ORAL at 17:56

## 2017-01-01 RX ADMIN — GUAIFENESIN 600 MG: 600 TABLET, EXTENDED RELEASE ORAL at 20:39

## 2017-01-01 RX ADMIN — HALOPERIDOL LACTATE 2 MG: 5 INJECTION, SOLUTION INTRAMUSCULAR at 03:36

## 2017-01-01 RX ADMIN — BUDESONIDE AND FORMOTEROL FUMARATE DIHYDRATE 1 PUFF: 160; 4.5 AEROSOL RESPIRATORY (INHALATION) at 20:48

## 2017-01-01 RX ADMIN — DIVALPROEX SODIUM 250 MG: 125 CAPSULE, COATED PELLETS ORAL at 20:32

## 2017-01-01 RX ADMIN — DOXYCYCLINE 100 MG: 100 CAPSULE ORAL at 20:10

## 2017-01-01 RX ADMIN — HYDRALAZINE HYDROCHLORIDE 75 MG: 50 TABLET, FILM COATED ORAL at 06:55

## 2017-01-01 RX ADMIN — DIVALPROEX SODIUM 250 MG: 125 CAPSULE, COATED PELLETS ORAL at 09:20

## 2017-01-01 RX ADMIN — HYDRALAZINE HYDROCHLORIDE 50 MG: 50 TABLET ORAL at 06:58

## 2017-01-01 RX ADMIN — HYDRALAZINE HYDROCHLORIDE 50 MG: 50 TABLET ORAL at 13:57

## 2017-01-01 RX ADMIN — MONTELUKAST 10 MG: 10 TABLET, FILM COATED ORAL at 20:11

## 2017-01-01 RX ADMIN — FLUTICASONE PROPIONATE 1 SPRAY: 50 SPRAY, METERED NASAL at 10:43

## 2017-01-01 RX ADMIN — ACETAMINOPHEN 650 MG: 325 TABLET ORAL at 09:26

## 2017-01-01 RX ADMIN — HYDRALAZINE HYDROCHLORIDE 50 MG: 50 TABLET ORAL at 06:09

## 2017-01-01 RX ADMIN — FELODIPINE 10 MG: 10 TABLET, FILM COATED, EXTENDED RELEASE ORAL at 10:10

## 2017-01-01 RX ADMIN — GUAIFENESIN 600 MG: 600 TABLET, EXTENDED RELEASE ORAL at 10:43

## 2017-01-01 RX ADMIN — MORPHINE SULFATE 2 MG: 2 INJECTION, SOLUTION INTRAMUSCULAR; INTRAVENOUS at 21:07

## 2017-01-01 RX ADMIN — LORAZEPAM 2 MG: 2 INJECTION INTRAMUSCULAR; INTRAVENOUS at 00:48

## 2017-01-01 RX ADMIN — ENOXAPARIN SODIUM 40 MG: 40 INJECTION SUBCUTANEOUS at 08:49

## 2017-01-01 RX ADMIN — QUETIAPINE FUMARATE 50 MG: 50 TABLET, FILM COATED ORAL at 02:23

## 2017-01-01 RX ADMIN — ACETAMINOPHEN 650 MG: 325 TABLET ORAL at 21:00

## 2017-01-01 RX ADMIN — MONTELUKAST 10 MG: 10 TABLET, FILM COATED ORAL at 21:19

## 2017-01-01 RX ADMIN — LORAZEPAM 2 MG: 2 INJECTION INTRAMUSCULAR; INTRAVENOUS at 05:18

## 2017-01-01 RX ADMIN — MORPHINE SULFATE 4 MG: 4 INJECTION, SOLUTION INTRAMUSCULAR; INTRAVENOUS at 05:18

## 2017-01-01 RX ADMIN — VANCOMYCIN HYDROCHLORIDE 1500 MG: 10 INJECTION, POWDER, LYOPHILIZED, FOR SOLUTION INTRAVENOUS at 19:19

## 2017-01-01 RX ADMIN — FELODIPINE 10 MG: 10 TABLET, FILM COATED, EXTENDED RELEASE ORAL at 17:41

## 2017-01-01 RX ADMIN — FELODIPINE 10 MG: 10 TABLET, FILM COATED, EXTENDED RELEASE ORAL at 20:10

## 2017-01-01 RX ADMIN — DOXYCYCLINE 100 MG: 100 CAPSULE ORAL at 08:47

## 2017-01-01 RX ADMIN — DIVALPROEX SODIUM 250 MG: 125 CAPSULE, COATED PELLETS ORAL at 09:59

## 2017-01-01 RX ADMIN — GUAIFENESIN 600 MG: 600 TABLET, EXTENDED RELEASE ORAL at 09:20

## 2017-01-01 RX ADMIN — DOCUSATE SODIUM -SENNOSIDES 2 TABLET: 50; 8.6 TABLET, COATED ORAL at 20:57

## 2017-01-01 RX ADMIN — FELODIPINE 10 MG: 10 TABLET, FILM COATED, EXTENDED RELEASE ORAL at 10:40

## 2017-01-01 RX ADMIN — CEFTRIAXONE SODIUM 1 G: 1 INJECTION, SOLUTION INTRAVENOUS at 17:49

## 2017-01-01 RX ADMIN — MEMANTINE HYDROCHLORIDE 5 MG: 5 TABLET, FILM COATED ORAL at 00:02

## 2017-01-01 RX ADMIN — LORAZEPAM 2 MG: 2 INJECTION INTRAMUSCULAR; INTRAVENOUS at 21:15

## 2017-01-01 RX ADMIN — MONTELUKAST 10 MG: 10 TABLET, FILM COATED ORAL at 20:17

## 2017-01-01 RX ADMIN — DOCUSATE SODIUM -SENNOSIDES 2 TABLET: 50; 8.6 TABLET, COATED ORAL at 20:17

## 2017-01-01 RX ADMIN — HYDRALAZINE HYDROCHLORIDE 50 MG: 50 TABLET ORAL at 16:18

## 2017-01-01 RX ADMIN — OLANZAPINE 5 MG: 5 TABLET, FILM COATED ORAL at 17:13

## 2017-01-01 RX ADMIN — HALOPERIDOL 3 MG: 1 TABLET ORAL at 20:49

## 2017-01-01 RX ADMIN — BUDESONIDE AND FORMOTEROL FUMARATE DIHYDRATE 2 PUFF: 160; 4.5 AEROSOL RESPIRATORY (INHALATION) at 06:54

## 2017-01-01 RX ADMIN — FLUTICASONE PROPIONATE 1 SPRAY: 50 SPRAY, METERED NASAL at 09:26

## 2017-01-01 RX ADMIN — GLYCOPYRROLATE 0.4 MG: 0.2 INJECTION, SOLUTION INTRAMUSCULAR; INTRAVENOUS at 05:33

## 2017-01-01 RX ADMIN — DIVALPROEX SODIUM 250 MG: 250 TABLET, DELAYED RELEASE ORAL at 09:07

## 2017-01-01 RX ADMIN — GLYCOPYRROLATE 0.8 MG: 0.2 INJECTION, SOLUTION INTRAMUSCULAR; INTRAVENOUS at 08:41

## 2017-01-01 RX ADMIN — HYDRALAZINE HYDROCHLORIDE 50 MG: 50 TABLET ORAL at 21:18

## 2017-01-01 RX ADMIN — Medication 5 MG: at 20:36

## 2017-01-01 RX ADMIN — GUAIFENESIN 600 MG: 600 TABLET, EXTENDED RELEASE ORAL at 12:08

## 2017-01-01 RX ADMIN — DOXYCYCLINE 100 MG: 100 CAPSULE ORAL at 10:08

## 2017-01-01 RX ADMIN — FELODIPINE 10 MG: 10 TABLET, FILM COATED, EXTENDED RELEASE ORAL at 10:00

## 2017-01-01 RX ADMIN — CYCLOBENZAPRINE HYDROCHLORIDE 5 MG: 5 TABLET, FILM COATED ORAL at 20:36

## 2017-01-01 RX ADMIN — HYDRALAZINE HYDROCHLORIDE 50 MG: 50 TABLET ORAL at 06:44

## 2017-01-01 RX ADMIN — MIRTAZAPINE 7.5 MG: 15 TABLET, FILM COATED ORAL at 20:35

## 2017-01-01 RX ADMIN — GLYCOPYRROLATE 0.4 MG: 0.2 INJECTION, SOLUTION INTRAMUSCULAR; INTRAVENOUS at 08:52

## 2017-01-01 RX ADMIN — DIVALPROEX SODIUM 250 MG: 250 TABLET, DELAYED RELEASE ORAL at 11:46

## 2017-01-01 RX ADMIN — LORAZEPAM 2 MG: 2 INJECTION INTRAMUSCULAR; INTRAVENOUS at 12:48

## 2017-01-01 RX ADMIN — BUDESONIDE AND FORMOTEROL FUMARATE DIHYDRATE 2 PUFF: 160; 4.5 AEROSOL RESPIRATORY (INHALATION) at 19:35

## 2017-01-01 RX ADMIN — LORAZEPAM 1 MG: 1 TABLET ORAL at 18:56

## 2017-01-01 RX ADMIN — DIVALPROEX SODIUM 250 MG: 250 TABLET, DELAYED RELEASE ORAL at 08:49

## 2017-01-01 RX ADMIN — MORPHINE SULFATE 2 MG: 2 INJECTION, SOLUTION INTRAMUSCULAR; INTRAVENOUS at 21:04

## 2017-01-01 RX ADMIN — GLYCOPYRROLATE 0.4 MG: 0.2 INJECTION, SOLUTION INTRAMUSCULAR; INTRAVENOUS at 16:55

## 2017-01-01 RX ADMIN — GUAIFENESIN 600 MG: 600 TABLET, EXTENDED RELEASE ORAL at 09:07

## 2017-01-01 RX ADMIN — MORPHINE SULFATE 4 MG: 4 INJECTION, SOLUTION INTRAMUSCULAR; INTRAVENOUS at 21:14

## 2017-01-01 RX ADMIN — ASPIRIN 81 MG: 81 TABLET ORAL at 09:08

## 2017-01-01 RX ADMIN — FLUTICASONE PROPIONATE 1 SPRAY: 50 SPRAY, METERED NASAL at 09:56

## 2017-01-01 RX ADMIN — FAMOTIDINE 10 MG: 10 INJECTION, SOLUTION INTRAVENOUS at 19:19

## 2017-01-01 RX ADMIN — MIRTAZAPINE 7.5 MG: 15 TABLET, FILM COATED ORAL at 20:11

## 2017-01-01 RX ADMIN — FELODIPINE 10 MG: 10 TABLET, FILM COATED, EXTENDED RELEASE ORAL at 20:39

## 2017-01-01 RX ADMIN — GUAIFENESIN 600 MG: 600 TABLET, EXTENDED RELEASE ORAL at 09:59

## 2017-01-01 RX ADMIN — LORAZEPAM 1 MG: 1 TABLET ORAL at 00:06

## 2017-01-01 RX ADMIN — LORAZEPAM 2 MG: 2 INJECTION INTRAMUSCULAR; INTRAVENOUS at 18:35

## 2017-01-01 RX ADMIN — DIVALPROEX SODIUM 250 MG: 125 CAPSULE, COATED PELLETS ORAL at 20:49

## 2017-01-01 RX ADMIN — CYCLOBENZAPRINE HYDROCHLORIDE 5 MG: 5 TABLET, FILM COATED ORAL at 20:24

## 2017-01-01 RX ADMIN — MIRTAZAPINE 7.5 MG: 15 TABLET, FILM COATED ORAL at 20:52

## 2017-01-01 RX ADMIN — DIVALPROEX SODIUM 250 MG: 250 TABLET, DELAYED RELEASE ORAL at 08:45

## 2017-01-01 RX ADMIN — ACETAMINOPHEN 650 MG: 325 TABLET ORAL at 06:34

## 2017-01-01 RX ADMIN — GUAIFENESIN 600 MG: 600 TABLET, EXTENDED RELEASE ORAL at 17:48

## 2017-01-01 RX ADMIN — QUETIAPINE FUMARATE 50 MG: 50 TABLET, FILM COATED ORAL at 20:11

## 2017-01-01 RX ADMIN — ASPIRIN 81 MG: 81 TABLET ORAL at 12:22

## 2017-01-01 RX ADMIN — LORAZEPAM 1 MG: 2 INJECTION INTRAMUSCULAR; INTRAVENOUS at 22:16

## 2017-01-01 RX ADMIN — ASPIRIN 81 MG: 81 TABLET ORAL at 09:59

## 2017-01-01 RX ADMIN — GLYCOPYRROLATE 0.4 MG: 0.2 INJECTION, SOLUTION INTRAMUSCULAR; INTRAVENOUS at 08:06

## 2017-01-01 RX ADMIN — POLYETHYLENE GLYCOL 3350 17 G: 17 POWDER, FOR SOLUTION ORAL at 09:31

## 2017-01-01 RX ADMIN — DIVALPROEX SODIUM 250 MG: 125 CAPSULE, COATED PELLETS ORAL at 21:19

## 2017-01-01 RX ADMIN — GUAIFENESIN 600 MG: 600 TABLET, EXTENDED RELEASE ORAL at 17:13

## 2017-01-01 RX ADMIN — MORPHINE SULFATE 4 MG: 4 INJECTION, SOLUTION INTRAMUSCULAR; INTRAVENOUS at 07:57

## 2017-01-01 RX ADMIN — FLUTICASONE PROPIONATE 1 SPRAY: 50 SPRAY, METERED NASAL at 10:09

## 2017-01-01 RX ADMIN — POTASSIUM CHLORIDE 20 MEQ: 750 CAPSULE, EXTENDED RELEASE ORAL at 15:52

## 2017-01-01 RX ADMIN — DOCUSATE SODIUM -SENNOSIDES 2 TABLET: 50; 8.6 TABLET, COATED ORAL at 20:24

## 2017-01-01 RX ADMIN — ACETAMINOPHEN 650 MG: 325 TABLET ORAL at 12:27

## 2017-01-01 RX ADMIN — CYCLOBENZAPRINE HYDROCHLORIDE 5 MG: 5 TABLET, FILM COATED ORAL at 09:52

## 2017-01-01 RX ADMIN — FELODIPINE 10 MG: 10 TABLET, FILM COATED, EXTENDED RELEASE ORAL at 17:26

## 2017-01-01 RX ADMIN — MORPHINE SULFATE 4 MG: 4 INJECTION, SOLUTION INTRAMUSCULAR; INTRAVENOUS at 14:01

## 2017-01-01 RX ADMIN — OLANZAPINE 5 MG: 5 TABLET, FILM COATED ORAL at 12:56

## 2017-01-01 RX ADMIN — ASPIRIN 81 MG: 81 TABLET ORAL at 08:46

## 2017-01-01 RX ADMIN — BUDESONIDE AND FORMOTEROL FUMARATE DIHYDRATE 2 PUFF: 160; 4.5 AEROSOL RESPIRATORY (INHALATION) at 07:24

## 2017-01-01 RX ADMIN — BUDESONIDE AND FORMOTEROL FUMARATE DIHYDRATE 2 PUFF: 160; 4.5 AEROSOL RESPIRATORY (INHALATION) at 20:39

## 2017-01-01 RX ADMIN — FELODIPINE 10 MG: 10 TABLET, FILM COATED, EXTENDED RELEASE ORAL at 22:18

## 2017-01-01 RX ADMIN — FELODIPINE 10 MG: 10 TABLET, FILM COATED, EXTENDED RELEASE ORAL at 10:36

## 2017-01-01 RX ADMIN — MORPHINE SULFATE 2 MG: 2 INJECTION, SOLUTION INTRAMUSCULAR; INTRAVENOUS at 04:42

## 2017-01-01 RX ADMIN — MEMANTINE HYDROCHLORIDE 5 MG: 5 TABLET, FILM COATED ORAL at 20:17

## 2017-01-01 RX ADMIN — LORAZEPAM 1 MG: 2 INJECTION INTRAMUSCULAR; INTRAVENOUS at 15:40

## 2017-01-01 RX ADMIN — BUDESONIDE AND FORMOTEROL FUMARATE DIHYDRATE 2 PUFF: 160; 4.5 AEROSOL RESPIRATORY (INHALATION) at 19:50

## 2017-01-01 RX ADMIN — MIRTAZAPINE 7.5 MG: 15 TABLET, FILM COATED ORAL at 20:32

## 2017-01-01 RX ADMIN — FELODIPINE 10 MG: 10 TABLET, FILM COATED, EXTENDED RELEASE ORAL at 12:07

## 2017-01-01 RX ADMIN — GLYCOPYRROLATE 0.4 MG: 0.2 INJECTION, SOLUTION INTRAMUSCULAR; INTRAVENOUS at 13:10

## 2017-01-01 RX ADMIN — Medication 5 MG: at 20:57

## 2017-01-01 RX ADMIN — DIVALPROEX SODIUM 250 MG: 125 CAPSULE, COATED PELLETS ORAL at 21:08

## 2017-01-01 RX ADMIN — MEMANTINE HYDROCHLORIDE 5 MG: 5 TABLET, FILM COATED ORAL at 20:24

## 2017-01-01 RX ADMIN — MIRTAZAPINE 7.5 MG: 15 TABLET, FILM COATED ORAL at 02:23

## 2017-01-01 RX ADMIN — BUDESONIDE AND FORMOTEROL FUMARATE DIHYDRATE 2 PUFF: 160; 4.5 AEROSOL RESPIRATORY (INHALATION) at 21:00

## 2017-01-01 RX ADMIN — Medication 5 MG: at 20:40

## 2017-01-01 RX ADMIN — MIRTAZAPINE 15 MG: 15 TABLET, FILM COATED ORAL at 21:18

## 2017-01-01 RX ADMIN — LORAZEPAM 2 MG: 2 INJECTION INTRAMUSCULAR; INTRAVENOUS at 16:55

## 2017-01-01 RX ADMIN — POTASSIUM CHLORIDE 10 MEQ: 750 CAPSULE, EXTENDED RELEASE ORAL at 12:56

## 2017-01-01 RX ADMIN — MEMANTINE HYDROCHLORIDE 5 MG: 5 TABLET, FILM COATED ORAL at 20:49

## 2017-01-01 RX ADMIN — FELODIPINE 10 MG: 10 TABLET, FILM COATED, EXTENDED RELEASE ORAL at 20:56

## 2017-01-01 RX ADMIN — BUDESONIDE AND FORMOTEROL FUMARATE DIHYDRATE 2 PUFF: 160; 4.5 AEROSOL RESPIRATORY (INHALATION) at 07:35

## 2017-01-01 RX ADMIN — MORPHINE SULFATE 4 MG: 4 INJECTION, SOLUTION INTRAMUSCULAR; INTRAVENOUS at 16:55

## 2017-01-01 RX ADMIN — FELODIPINE 10 MG: 10 TABLET, FILM COATED, EXTENDED RELEASE ORAL at 20:49

## 2017-01-01 RX ADMIN — MEMANTINE HYDROCHLORIDE 5 MG: 5 TABLET, FILM COATED ORAL at 20:35

## 2017-01-01 RX ADMIN — CYANOCOBALAMIN TAB 500 MCG 1000 MCG: 500 TAB at 09:52

## 2017-01-01 RX ADMIN — LORAZEPAM 1 MG: 2 INJECTION INTRAMUSCULAR; INTRAVENOUS at 21:06

## 2017-01-01 RX ADMIN — HYDRALAZINE HYDROCHLORIDE 50 MG: 50 TABLET ORAL at 21:51

## 2017-01-01 RX ADMIN — ACETAMINOPHEN 650 MG: 325 TABLET ORAL at 20:09

## 2017-01-01 RX ADMIN — DIVALPROEX SODIUM 250 MG: 125 CAPSULE, COATED PELLETS ORAL at 16:48

## 2017-01-01 RX ADMIN — MORPHINE SULFATE 4 MG: 4 INJECTION, SOLUTION INTRAMUSCULAR; INTRAVENOUS at 05:33

## 2017-01-01 RX ADMIN — LORAZEPAM 2 MG: 2 INJECTION INTRAMUSCULAR; INTRAVENOUS at 12:50

## 2017-01-01 RX ADMIN — MONTELUKAST 10 MG: 10 TABLET, FILM COATED ORAL at 22:17

## 2017-01-01 RX ADMIN — ASPIRIN 81 MG: 81 TABLET ORAL at 08:31

## 2017-01-01 RX ADMIN — GLYCOPYRROLATE 0.4 MG: 0.2 INJECTION, SOLUTION INTRAMUSCULAR; INTRAVENOUS at 21:04

## 2017-01-01 RX ADMIN — HYDRALAZINE HYDROCHLORIDE 50 MG: 50 TABLET ORAL at 14:00

## 2017-01-01 RX ADMIN — ASPIRIN 81 MG: 81 TABLET ORAL at 09:55

## 2017-01-01 RX ADMIN — DOCUSATE SODIUM -SENNOSIDES 2 TABLET: 50; 8.6 TABLET, COATED ORAL at 20:32

## 2017-01-01 RX ADMIN — Medication 5 MG: at 22:17

## 2017-01-01 RX ADMIN — DIVALPROEX SODIUM 250 MG: 125 CAPSULE, COATED PELLETS ORAL at 12:22

## 2017-01-01 RX ADMIN — POTASSIUM CHLORIDE 10 MEQ: 750 CAPSULE, EXTENDED RELEASE ORAL at 10:43

## 2017-01-01 RX ADMIN — GUAIFENESIN 600 MG: 600 TABLET, EXTENDED RELEASE ORAL at 17:26

## 2017-01-01 RX ADMIN — MIRTAZAPINE 7.5 MG: 15 TABLET, FILM COATED ORAL at 22:18

## 2017-01-01 RX ADMIN — BUDESONIDE AND FORMOTEROL FUMARATE DIHYDRATE 2 PUFF: 160; 4.5 AEROSOL RESPIRATORY (INHALATION) at 20:40

## 2017-01-01 RX ADMIN — DIVALPROEX SODIUM 500 MG: 125 CAPSULE, COATED PELLETS ORAL at 17:26

## 2017-01-01 RX ADMIN — DIVALPROEX SODIUM 500 MG: 125 CAPSULE, COATED PELLETS ORAL at 18:00

## 2017-01-01 RX ADMIN — MORPHINE SULFATE 4 MG: 4 INJECTION, SOLUTION INTRAMUSCULAR; INTRAVENOUS at 12:50

## 2017-01-01 RX ADMIN — LORAZEPAM 1 MG: 2 INJECTION INTRAMUSCULAR; INTRAVENOUS at 03:23

## 2017-01-01 RX ADMIN — OLANZAPINE 10 MG: 10 TABLET, FILM COATED ORAL at 20:38

## 2017-01-01 RX ADMIN — LORAZEPAM 2 MG: 2 INJECTION INTRAMUSCULAR; INTRAVENOUS at 17:21

## 2017-01-01 RX ADMIN — FELODIPINE 10 MG: 10 TABLET, FILM COATED, EXTENDED RELEASE ORAL at 09:20

## 2017-01-01 RX ADMIN — Medication 5 MG: at 21:01

## 2017-01-01 RX ADMIN — DIVALPROEX SODIUM 250 MG: 250 TABLET, DELAYED RELEASE ORAL at 13:18

## 2017-01-01 RX ADMIN — MORPHINE SULFATE 4 MG: 4 INJECTION, SOLUTION INTRAMUSCULAR; INTRAVENOUS at 08:41

## 2017-01-01 RX ADMIN — HYDRALAZINE HYDROCHLORIDE 75 MG: 50 TABLET, FILM COATED ORAL at 20:57

## 2017-01-01 RX ADMIN — GLYCOPYRROLATE 0.8 MG: 0.2 INJECTION, SOLUTION INTRAMUSCULAR; INTRAVENOUS at 01:01

## 2017-01-01 RX ADMIN — MORPHINE SULFATE 4 MG: 4 INJECTION, SOLUTION INTRAMUSCULAR; INTRAVENOUS at 18:35

## 2017-01-01 RX ADMIN — FLUTICASONE PROPIONATE 1 SPRAY: 50 SPRAY, METERED NASAL at 09:08

## 2017-01-01 RX ADMIN — FAMOTIDINE 10 MG: 10 INJECTION, SOLUTION INTRAVENOUS at 20:58

## 2017-01-01 RX ADMIN — HYDRALAZINE HYDROCHLORIDE 50 MG: 50 TABLET ORAL at 07:15

## 2017-01-01 RX ADMIN — HYDRALAZINE HYDROCHLORIDE 50 MG: 50 TABLET ORAL at 14:36

## 2017-01-01 RX ADMIN — MONTELUKAST 10 MG: 10 TABLET, FILM COATED ORAL at 20:49

## 2017-01-01 RX ADMIN — CYCLOBENZAPRINE HYDROCHLORIDE 5 MG: 5 TABLET, FILM COATED ORAL at 23:43

## 2017-01-01 RX ADMIN — POTASSIUM CHLORIDE 10 MEQ: 750 CAPSULE, EXTENDED RELEASE ORAL at 08:49

## 2017-01-01 RX ADMIN — QUETIAPINE FUMARATE 50 MG: 50 TABLET, FILM COATED ORAL at 22:18

## 2017-01-01 RX ADMIN — GUAIFENESIN 600 MG: 600 TABLET, EXTENDED RELEASE ORAL at 09:52

## 2017-01-01 RX ADMIN — Medication 5 MG: at 23:41

## 2017-01-01 RX ADMIN — DOXYCYCLINE 100 MG: 100 CAPSULE ORAL at 08:55

## 2017-01-01 RX ADMIN — GLYCOPYRROLATE 0.4 MG: 0.2 INJECTION, SOLUTION INTRAMUSCULAR; INTRAVENOUS at 20:40

## 2017-01-01 RX ADMIN — OLANZAPINE 5 MG: 5 TABLET, FILM COATED ORAL at 09:20

## 2017-01-01 RX ADMIN — ACETAMINOPHEN 650 MG: 325 TABLET ORAL at 21:51

## 2017-01-01 RX ADMIN — HYDRALAZINE HYDROCHLORIDE 50 MG: 50 TABLET ORAL at 15:32

## 2017-01-01 RX ADMIN — DOCUSATE SODIUM -SENNOSIDES 2 TABLET: 50; 8.6 TABLET, COATED ORAL at 21:18

## 2017-01-01 RX ADMIN — DIVALPROEX SODIUM 500 MG: 125 CAPSULE, COATED PELLETS ORAL at 10:39

## 2017-01-01 RX ADMIN — POLYETHYLENE GLYCOL 3350 17 G: 17 POWDER, FOR SOLUTION ORAL at 10:46

## 2017-01-01 RX ADMIN — LORAZEPAM 1 MG: 1 TABLET ORAL at 20:38

## 2017-01-01 RX ADMIN — SCOPOLAMINE 1 PATCH: 1 PATCH, EXTENDED RELEASE TRANSDERMAL at 16:32

## 2017-01-01 RX ADMIN — MORPHINE SULFATE 4 MG: 4 INJECTION, SOLUTION INTRAMUSCULAR; INTRAVENOUS at 17:22

## 2017-01-01 RX ADMIN — DIVALPROEX SODIUM 250 MG: 250 TABLET, DELAYED RELEASE ORAL at 12:01

## 2017-01-01 RX ADMIN — LORAZEPAM 1 MG: 2 INJECTION INTRAMUSCULAR; INTRAVENOUS at 12:24

## 2017-01-01 RX ADMIN — ASPIRIN 81 MG: 81 TABLET ORAL at 10:43

## 2017-01-01 RX ADMIN — DIVALPROEX SODIUM 250 MG: 250 TABLET, DELAYED RELEASE ORAL at 20:49

## 2017-01-01 RX ADMIN — OLANZAPINE 5 MG: 5 TABLET, FILM COATED ORAL at 17:42

## 2017-01-01 RX ADMIN — SODIUM CHLORIDE 75 ML/HR: 9 INJECTION, SOLUTION INTRAVENOUS at 00:54

## 2017-01-01 RX ADMIN — DIVALPROEX SODIUM 250 MG: 250 TABLET, DELAYED RELEASE ORAL at 09:52

## 2017-01-01 RX ADMIN — HYDRALAZINE HYDROCHLORIDE 50 MG: 50 TABLET ORAL at 06:50

## 2017-01-01 RX ADMIN — LORAZEPAM 1 MG: 2 INJECTION INTRAMUSCULAR; INTRAVENOUS at 20:47

## 2017-01-01 RX ADMIN — MIRTAZAPINE 15 MG: 15 TABLET, FILM COATED ORAL at 20:17

## 2017-01-01 RX ADMIN — OLANZAPINE 5 MG: 5 TABLET, FILM COATED ORAL at 09:55

## 2017-01-01 RX ADMIN — HYDRALAZINE HYDROCHLORIDE 75 MG: 50 TABLET, FILM COATED ORAL at 09:52

## 2017-01-01 RX ADMIN — FELODIPINE 10 MG: 10 TABLET, FILM COATED, EXTENDED RELEASE ORAL at 12:23

## 2017-01-01 RX ADMIN — HYDRALAZINE HYDROCHLORIDE 75 MG: 50 TABLET, FILM COATED ORAL at 05:03

## 2017-01-01 RX ADMIN — DOCUSATE SODIUM -SENNOSIDES 2 TABLET: 50; 8.6 TABLET, COATED ORAL at 20:38

## 2017-01-01 RX ADMIN — MONTELUKAST 10 MG: 10 TABLET, FILM COATED ORAL at 02:23

## 2017-01-01 RX ADMIN — GLYCOPYRROLATE 0.4 MG: 0.2 INJECTION, SOLUTION INTRAMUSCULAR; INTRAVENOUS at 00:48

## 2017-01-01 RX ADMIN — GLYCOPYRROLATE 0.8 MG: 0.2 INJECTION, SOLUTION INTRAMUSCULAR; INTRAVENOUS at 18:35

## 2017-01-01 RX ADMIN — OLANZAPINE 10 MG: 10 INJECTION, POWDER, LYOPHILIZED, FOR SOLUTION INTRAMUSCULAR at 17:45

## 2017-01-01 RX ADMIN — MONTELUKAST 10 MG: 10 TABLET, FILM COATED ORAL at 20:57

## 2017-01-01 RX ADMIN — BUDESONIDE AND FORMOTEROL FUMARATE DIHYDRATE 1 PUFF: 160; 4.5 AEROSOL RESPIRATORY (INHALATION) at 19:51

## 2017-01-01 RX ADMIN — FELODIPINE 10 MG: 10 TABLET, FILM COATED, EXTENDED RELEASE ORAL at 10:09

## 2017-01-01 RX ADMIN — FLUTICASONE PROPIONATE 1 SPRAY: 50 SPRAY, METERED NASAL at 12:27

## 2017-01-01 RX ADMIN — FELODIPINE 10 MG: 10 TABLET, FILM COATED, EXTENDED RELEASE ORAL at 12:55

## 2017-01-01 RX ADMIN — HYDRALAZINE HYDROCHLORIDE 50 MG: 50 TABLET ORAL at 15:10

## 2017-01-01 RX ADMIN — DOCUSATE SODIUM -SENNOSIDES 2 TABLET: 50; 8.6 TABLET, COATED ORAL at 20:12

## 2017-01-01 RX ADMIN — HYDRALAZINE HYDROCHLORIDE 50 MG: 50 TABLET ORAL at 21:00

## 2017-01-01 RX ADMIN — LORAZEPAM 2 MG: 2 INJECTION INTRAMUSCULAR; INTRAVENOUS at 14:01

## 2017-01-01 RX ADMIN — GUAIFENESIN 600 MG: 600 TABLET, EXTENDED RELEASE ORAL at 17:42

## 2017-01-01 RX ADMIN — DIVALPROEX SODIUM 250 MG: 125 CAPSULE, COATED PELLETS ORAL at 16:18

## 2017-01-01 RX ADMIN — DIVALPROEX SODIUM 250 MG: 125 CAPSULE, COATED PELLETS ORAL at 20:09

## 2017-01-01 RX ADMIN — MONTELUKAST 10 MG: 10 TABLET, FILM COATED ORAL at 00:03

## 2017-01-01 RX ADMIN — GLYCOPYRROLATE 0.4 MG: 0.2 INJECTION, SOLUTION INTRAMUSCULAR; INTRAVENOUS at 07:57

## 2017-01-01 RX ADMIN — OLANZAPINE 5 MG: 5 TABLET, FILM COATED ORAL at 17:12

## 2017-01-01 RX ADMIN — MONTELUKAST 10 MG: 10 TABLET, FILM COATED ORAL at 20:33

## 2017-01-01 RX ADMIN — HYDRALAZINE HYDROCHLORIDE 50 MG: 50 TABLET ORAL at 14:15

## 2017-01-01 RX ADMIN — FLUTICASONE PROPIONATE 1 SPRAY: 50 SPRAY, METERED NASAL at 10:10

## 2017-01-01 RX ADMIN — DOCUSATE SODIUM -SENNOSIDES 2 TABLET: 50; 8.6 TABLET, COATED ORAL at 19:49

## 2017-01-01 RX ADMIN — QUETIAPINE FUMARATE 50 MG: 50 TABLET, FILM COATED ORAL at 20:57

## 2017-01-01 RX ADMIN — LORAZEPAM 1 MG: 2 INJECTION INTRAMUSCULAR; INTRAVENOUS at 04:42

## 2017-01-01 RX ADMIN — OLANZAPINE 10 MG: 5 TABLET, ORALLY DISINTEGRATING ORAL at 12:35

## 2017-01-01 RX ADMIN — GLYCOPYRROLATE 0.4 MG: 0.2 INJECTION, SOLUTION INTRAMUSCULAR; INTRAVENOUS at 00:49

## 2017-01-01 RX ADMIN — DIVALPROEX SODIUM 250 MG: 125 CAPSULE, COATED PELLETS ORAL at 16:14

## 2017-01-01 RX ADMIN — HYDRALAZINE HYDROCHLORIDE 75 MG: 50 TABLET, FILM COATED ORAL at 21:07

## 2017-01-01 RX ADMIN — OLANZAPINE 5 MG: 5 TABLET, FILM COATED ORAL at 18:14

## 2017-01-01 RX ADMIN — LORAZEPAM 2 MG: 2 INJECTION INTRAMUSCULAR; INTRAVENOUS at 00:32

## 2017-01-01 RX ADMIN — HYDRALAZINE HYDROCHLORIDE 50 MG: 50 TABLET ORAL at 14:52

## 2017-01-01 RX ADMIN — HYDRALAZINE HYDROCHLORIDE 75 MG: 50 TABLET, FILM COATED ORAL at 13:17

## 2017-01-01 RX ADMIN — GLYCOPYRROLATE 0.8 MG: 0.2 INJECTION, SOLUTION INTRAMUSCULAR; INTRAVENOUS at 00:32

## 2017-01-01 RX ADMIN — GUAIFENESIN 600 MG: 600 TABLET, EXTENDED RELEASE ORAL at 18:01

## 2017-01-01 RX ADMIN — FELODIPINE 10 MG: 10 TABLET, FILM COATED, EXTENDED RELEASE ORAL at 18:00

## 2017-01-01 RX ADMIN — POLYETHYLENE GLYCOL 3350 17 G: 17 POWDER, FOR SOLUTION ORAL at 13:10

## 2017-01-01 RX ADMIN — ASPIRIN 81 MG: 81 TABLET ORAL at 10:40

## 2017-01-01 RX ADMIN — OLANZAPINE 10 MG: 10 INJECTION, POWDER, LYOPHILIZED, FOR SOLUTION INTRAMUSCULAR at 16:20

## 2017-01-01 RX ADMIN — ASPIRIN 81 MG: 81 TABLET ORAL at 09:52

## 2017-01-01 RX ADMIN — LORAZEPAM 1 MG: 2 INJECTION INTRAMUSCULAR; INTRAVENOUS at 00:35

## 2017-01-01 RX ADMIN — BUDESONIDE AND FORMOTEROL FUMARATE DIHYDRATE 2 PUFF: 160; 4.5 AEROSOL RESPIRATORY (INHALATION) at 21:09

## 2017-01-01 RX ADMIN — ZIPRASIDONE MESYLATE 20 MG: 20 INJECTION, POWDER, LYOPHILIZED, FOR SOLUTION INTRAMUSCULAR at 22:15

## 2017-01-01 RX ADMIN — HYDRALAZINE HYDROCHLORIDE 50 MG: 50 TABLET ORAL at 23:01

## 2017-01-01 RX ADMIN — HYDRALAZINE HYDROCHLORIDE 50 MG: 50 TABLET ORAL at 16:46

## 2017-01-01 RX ADMIN — HYDRALAZINE HYDROCHLORIDE 75 MG: 50 TABLET, FILM COATED ORAL at 14:12

## 2017-01-01 RX ADMIN — ENOXAPARIN SODIUM 40 MG: 40 INJECTION SUBCUTANEOUS at 09:08

## 2017-01-01 RX ADMIN — DIVALPROEX SODIUM 250 MG: 250 TABLET, DELAYED RELEASE ORAL at 19:19

## 2017-01-01 RX ADMIN — GLYCOPYRROLATE 0.8 MG: 0.2 INJECTION, SOLUTION INTRAMUSCULAR; INTRAVENOUS at 16:40

## 2017-01-01 RX ADMIN — DIVALPROEX SODIUM 250 MG: 125 CAPSULE, COATED PELLETS ORAL at 17:41

## 2017-01-01 RX ADMIN — LORAZEPAM 2 MG: 2 INJECTION INTRAMUSCULAR; INTRAVENOUS at 13:10

## 2017-01-01 RX ADMIN — POLYETHYLENE GLYCOL (3350) 17 G: 17 POWDER, FOR SOLUTION ORAL at 09:53

## 2017-01-01 RX ADMIN — GLYCOPYRROLATE 0.4 MG: 0.2 INJECTION, SOLUTION INTRAMUSCULAR; INTRAVENOUS at 05:48

## 2017-01-01 RX ADMIN — LORAZEPAM 2 MG: 2 INJECTION INTRAMUSCULAR; INTRAVENOUS at 01:01

## 2017-01-01 RX ADMIN — POTASSIUM CHLORIDE 10 MEQ: 750 CAPSULE, EXTENDED RELEASE ORAL at 08:46

## 2017-01-01 RX ADMIN — CYCLOBENZAPRINE HYDROCHLORIDE 5 MG: 5 TABLET, FILM COATED ORAL at 08:31

## 2017-01-01 RX ADMIN — LORAZEPAM 1 MG: 2 INJECTION INTRAMUSCULAR; INTRAVENOUS at 08:09

## 2017-01-01 RX ADMIN — HALOPERIDOL LACTATE 2 MG: 5 INJECTION, SOLUTION INTRAMUSCULAR at 21:06

## 2017-01-01 RX ADMIN — MEMANTINE HYDROCHLORIDE 5 MG: 5 TABLET, FILM COATED ORAL at 02:23

## 2017-01-01 RX ADMIN — DEXTROMETHORPHAN HYDROBROMIDE AND QUINIDINE SULFATE 1 CAPSULE: 20; 10 CAPSULE, GELATIN COATED ORAL at 11:35

## 2017-01-01 RX ADMIN — LORAZEPAM 1 MG: 1 TABLET ORAL at 20:17

## 2017-01-01 RX ADMIN — DIVALPROEX SODIUM 500 MG: 125 CAPSULE, COATED PELLETS ORAL at 09:55

## 2017-01-01 RX ADMIN — MORPHINE SULFATE 4 MG: 4 INJECTION, SOLUTION INTRAMUSCULAR; INTRAVENOUS at 13:10

## 2017-01-01 RX ADMIN — GUAIFENESIN 600 MG: 600 TABLET, EXTENDED RELEASE ORAL at 18:14

## 2017-01-01 RX ADMIN — FLUTICASONE PROPIONATE 1 SPRAY: 50 SPRAY, METERED NASAL at 12:08

## 2017-01-01 RX ADMIN — ASPIRIN 81 MG: 81 TABLET ORAL at 10:09

## 2017-01-01 RX ADMIN — LORAZEPAM 1 MG: 2 INJECTION INTRAMUSCULAR; INTRAVENOUS at 00:48

## 2017-01-01 RX ADMIN — FLUTICASONE PROPIONATE 1 SPRAY: 50 SPRAY, METERED NASAL at 08:46

## 2017-01-01 RX ADMIN — SENNOSIDES 8.6 MG: 8.6 TABLET, FILM COATED ORAL at 08:49

## 2017-01-01 RX ADMIN — LORAZEPAM 2 MG: 2 INJECTION INTRAMUSCULAR; INTRAVENOUS at 08:41

## 2017-01-01 RX ADMIN — LORAZEPAM 1 MG: 1 TABLET ORAL at 20:49

## 2017-01-01 RX ADMIN — DIVALPROEX SODIUM 500 MG: 125 CAPSULE, COATED PELLETS ORAL at 17:43

## 2017-01-01 RX ADMIN — FELODIPINE 10 MG: 10 TABLET, FILM COATED, EXTENDED RELEASE ORAL at 18:14

## 2017-01-01 RX ADMIN — Medication 5 MG: at 23:38

## 2017-01-01 RX ADMIN — BUDESONIDE AND FORMOTEROL FUMARATE DIHYDRATE 2 PUFF: 160; 4.5 AEROSOL RESPIRATORY (INHALATION) at 20:20

## 2017-01-01 RX ADMIN — MORPHINE SULFATE 4 MG: 4 INJECTION, SOLUTION INTRAMUSCULAR; INTRAVENOUS at 20:40

## 2017-01-01 RX ADMIN — FLUTICASONE PROPIONATE 1 SPRAY: 50 SPRAY, METERED NASAL at 12:24

## 2017-08-03 PROBLEM — N39.0 ACUTE UTI: Status: ACTIVE | Noted: 2017-01-01

## 2017-08-03 NOTE — ED PROVIDER NOTES
EMERGENCY DEPARTMENT ENCOUNTER    CHIEF COMPLAINT  Chief Complaint: Fever  History given by: Pt, Patient's daughter  History limited by: Pt's dementia  Room Number: 23/23  PMD: Catarina Strange MD      HPI:  Pt is a 84 y.o. male who presents with a fever of 102.3.  Pt's daughter states that he has a hx of dementia and COPD, currently admitted at a psychiatric facility for behavior disturbances. Pt states he has some abdominal pain. Pt's daughter states that he has had a few falls lately, with the last being 1 week ago. Pt was seen at [facility] 1 week ago.    Duration:  Unknown  Onset: gradual  Timing: constant  Location: n/a  Radiation: n/a  Quality: fever of 102  Intensity/Severity: moderate  Progression: unchanged  Associated Symptoms: abdominal pain  Aggravating Factors: none  Alleviating Factors: none  Previous Episodes: Pt has had no previous episodes  Treatment before arrival: None    PAST MEDICAL HISTORY  Active Ambulatory Problems     Diagnosis Date Noted   • No Active Ambulatory Problems     Resolved Ambulatory Problems     Diagnosis Date Noted   • No Resolved Ambulatory Problems     No Additional Past Medical History       PAST SURGICAL HISTORY  No past surgical history on file.    FAMILY HISTORY  No family history on file.    SOCIAL HISTORY  Social History     Social History   • Marital status: Legally      Spouse name: N/A   • Number of children: N/A   • Years of education: N/A     Occupational History   • Not on file.     Social History Main Topics   • Smoking status: Not on file   • Smokeless tobacco: Not on file   • Alcohol use Not on file   • Drug use: Not on file   • Sexual activity: Not on file     Other Topics Concern   • Not on file     Social History Narrative       ALLERGIES  Prednisone    REVIEW OF SYSTEMS  Review of Systems   Unable to perform ROS: Dementia       PHYSICAL EXAM  ED Triage Vitals   Temp Heart Rate Resp BP SpO2   08/03/17 1518 08/03/17 1519 08/03/17 1519 08/03/17  1519 08/03/17 1519   102.3 °F (39.1 °C) 104 18 132/76 98 %      Temp src Heart Rate Source Patient Position BP Location FiO2 (%)   08/03/17 1518 08/03/17 1519 -- -- --   Tympanic Monitor          Physical Exam   Constitutional: He is oriented to person, place, and time and well-developed, well-nourished, and in no distress.   Pt is confused.   HENT:   Head: Normocephalic and atraumatic.   Eyes: EOM are normal. Pupils are equal, round, and reactive to light.   Neck: Normal range of motion. Neck supple.   Cardiovascular: Normal rate, regular rhythm and normal heart sounds.    Pulmonary/Chest: Effort normal and breath sounds normal. No respiratory distress.   Abdominal: Soft. There is no tenderness. There is no rebound and no guarding.   Musculoskeletal: Normal range of motion. He exhibits edema (trace BLE).   Neurological: He is alert and oriented to person, place, and time. He has normal sensation and normal strength.   Mild expressive aphasia. No focal deficits. Moves all extremities well.   Skin: Skin is warm and dry. No rash noted.   Psychiatric: Mood and affect normal.   Nursing note and vitals reviewed.      LAB RESULTS  Lab Results (last 24 hours)     Procedure Component Value Units Date/Time    CBC & Differential [[id number]] Collected:  08/03/17 1544    Specimen:  Blood Updated:  08/03/17 1604    Narrative:       The following orders were created for panel order CBC & Differential.  Procedure                               Abnormality         Status                     ---------                               -----------         ------                     CBC Auto Differential[[id number]]        Abnormal            Final result                 Please view results for these tests on the individual orders.    Comprehensive Metabolic Panel [[id number]]  (Abnormal) Collected:  08/03/17 1544    Specimen:  Blood Updated:  08/03/17 1629     Glucose 119 (H) mg/dL      BUN 24 (H) mg/dL      Creatinine 1.28 (H) mg/dL       Sodium 138 mmol/L      Potassium 4.1 mmol/L      Chloride 101 mmol/L      CO2 22.6 mmol/L      Calcium 8.9 mg/dL      Total Protein 6.8 g/dL      Albumin 3.40 (L) g/dL      ALT (SGPT) 20 U/L      AST (SGOT) 21 U/L      Alkaline Phosphatase 83 U/L      Total Bilirubin 0.5 mg/dL      eGFR Non African Amer 54 (L) mL/min/1.73      Globulin 3.4 gm/dL      A/G Ratio 1.0 g/dL      BUN/Creatinine Ratio 18.8     Anion Gap 14.4 mmol/L     Narrative:       The MDRD GFR formula is only valid for adults with stable renal function between ages 18 and 70.    BNP [[id number]]  (Abnormal) Collected:  08/03/17 1544    Specimen:  Blood Updated:  08/03/17 1635     proBNP 1999.0 (H) pg/mL     Narrative:       Among patients with dyspnea, NT-proBNP is highly sensitive for the detection of acute congestive heart failure. In addition NT-proBNP of <300 pg/ml effectively rules out acute congestive heart failure with 99% negative predictive value.    Troponin [[id number]]  (Abnormal) Collected:  08/03/17 1544    Specimen:  Blood Updated:  08/03/17 1640     Troponin T 0.056 (H) ng/mL     Narrative:       Troponin T Reference Ranges:  Less than 0.03 ng/mL:    Negative for AMI  0.03 to 0.09 ng/mL:      Indeterminant for AMI  Greater than 0.09 ng/mL: Positive for AMI    CBC Auto Differential [[id number]]  (Abnormal) Collected:  08/03/17 1544    Specimen:  Blood Updated:  08/03/17 1604     WBC 14.12 (H) 10*3/mm3      RBC 3.43 (L) 10*6/mm3      Hemoglobin 10.4 (L) g/dL      Hematocrit 33.3 (L) %      MCV 97.1 (H) fL      MCH 30.3 pg      MCHC 31.2 (L) g/dL      RDW 15.6 (H) %      RDW-SD 55.5 (H) fl      MPV 10.3 fL      Platelets 264 10*3/mm3      Neutrophil % 76.2 (H) %      Lymphocyte % 10.1 (L) %      Monocyte % 13.1 (H) %      Eosinophil % 0.1 (L) %      Basophil % 0.1 %      Immature Grans % 0.4 %      Neutrophils, Absolute 10.76 (H) 10*3/mm3      Lymphocytes, Absolute 1.42 10*3/mm3      Monocytes, Absolute 1.85 (H) 10*3/mm3      Eosinophils,  "Absolute 0.01 10*3/mm3      Basophils, Absolute 0.02 10*3/mm3      Immature Grans, Absolute 0.06 (H) 10*3/mm3     Protime-INR [[id number]]  (Abnormal) Collected:  08/03/17 1544    Specimen:  Blood Updated:  08/03/17 1737     Protime 16.0 (H) Seconds      INR 1.33 (H)    Procalcitonin [[id number]]  (Normal) Collected:  08/03/17 1544    Specimen:  Blood Updated:  08/03/17 1635     Procalcitonin 0.20 ng/mL     Narrative:       As a Marker for Sepsis (Non-Neonates):   1. <0.5 ng/mL represents a low risk of severe sepsis and/or septic shock.  1. >2 ng/mL represents a high risk of severe sepsis and/or septic shock.    As a Marker for Lower Respiratory Tract Infections that require antibiotic therapy:  PCT on Admission     Antibiotic Therapy             6-12 Hrs later  > 0.5                Strongly Recommended            >0.25 - <0.5         Recommended  0.1 - 0.25           Discouraged                   Remeasure/reassess PCT  <0.1                 Strongly Discouraged          Remeasure/reassess PCT      As 28 day mortality risk marker: \"Change in Procalcitonin Result\" (> 80 % or <=80 %) if Day 0 (or Day 1) and Day 4 values are available. Refer to http://www.[facility]-pct-calculator.com/   Change in PCT <=80 %   A decrease of PCT levels below or equal to 80 % defines a positive change in PCT test result representing a higher risk for 28-day all-cause mortality of patients diagnosed with severe sepsis or septic shock.  Change in PCT > 80 %   A decrease of PCT levels of more than 80 % defines a negative change in PCT result representing a lower risk for 28-day all-cause mortality of patients diagnosed with severe sepsis or septic shock.                Blood Culture [[id number]] Collected:  08/03/17 1615    Specimen:  Blood from Arm, Right Updated:  08/03/17 1633    Lactic Acid, Plasma [[id number]]  (Normal) Collected:  08/03/17 1623    Specimen:  Blood Updated:  08/03/17 1648     Lactate 2.0 mmol/L     Blood Culture [[id number]] " Collected:  08/03/17 1623    Specimen:  Blood from Arm, Left Updated:  08/03/17 1633    Urinalysis With / Culture If Indicated [[id number]]  (Abnormal) Collected:  08/03/17 1738    Specimen:  Urine from Urine, Catheter Updated:  08/03/17 1748     Color, UA Dark Yellow (A)     Appearance, UA Cloudy (A)     pH, UA 7.0     Specific Gravity, UA 1.015     Glucose, UA Negative     Ketones, UA Negative     Bilirubin, UA Negative     Blood, UA Small (1+) (A)     Protein,  mg/dL (2+) (A)     Leuk Esterase, UA Large (3+) (A)     Nitrite, UA Negative     Urobilinogen, UA 1.0 E.U./dL    Urine Culture [[id number]] Collected:  08/03/17 1738    Specimen:  Urine from Urine, Catheter Updated:  08/03/17 1742    Urinalysis, Microscopic Only [[id number]]  (Abnormal) Collected:  08/03/17 1738    Specimen:  Urine from Urine, Catheter Updated:  08/03/17 1805     RBC, UA 3-5 (A) /HPF      WBC, UA Too Numerous to Count (A) /HPF      Bacteria, UA 4+ (A) /HPF      Squamous Epithelial Cells, UA None Seen /HPF      Hyaline Casts, UA 0-2 /LPF      Methodology Manual Light Microscopy          I ordered the above labs and reviewed the results    RADIOLOGY  XR Chest 1 View         See dictated report.     I ordered the above noted radiological studies. Interpreted by radiologist. Reviewed by me in PACS.       PROCEDURES  Procedures  EKG           EKG time: 1605  Rhythm/Rate: NSR 82  P waves and CA: nml  QRS, axis: nml   ST and T waves: nml     Interpreted Contemporaneously by me, independently viewed  No prior for comparison    PROGRESS AND CONSULTS  ED Course     1541  Ordered EKG, blood work, troponin, BNP, and CXR for further evaluation.    1553  Ordered Tylenol for fever. Ordered INR, procalcitonin, and UA for further evaluation.    1811  Ordered Rocephin for UTI. Placed call to A for admission.    1816  Pneumonia less likely as lung exam is normal.     1819  Rechecked pt. Notified pt of UTI and plan to admit. Pt and family agree to plan  and all questions are addressed.     1824  Discussed pt's case with Dr. Tejada ([facility]), who agrees to admit to telemetry.     MEDICAL DECISION MAKING  Results were reviewed/discussed with the patient and they were also made aware of online access. Pt also made aware that some labs, such as cultures, will not be resulted during ER visit and follow up with PMD is necessary.     MDM  Number of Diagnoses or Management Options     Amount and/or Complexity of Data Reviewed  Clinical lab tests: ordered and reviewed  Tests in the radiology section of CPT®: ordered and reviewed  Tests in the medicine section of CPT®: ordered and reviewed (See procedure EKG note)  Decide to obtain previous medical records or to obtain history from someone other than the patient: yes  Review and summarize past medical records: yes (Pt has no prior visits here.)           DIAGNOSIS  Final diagnoses:   Acute UTI   Fever in adult       DISPOSITION  ADMISSION    Discussed treatment plan and reason for admission with pt/family and admitting physician.  Pt/family voiced understanding of the plan for admission for further testing/treatment as needed.         Latest Documented Vital Signs:  As of 6:14 PM  BP- 133/75 HR- 72 Temp- 99.2 °F (37.3 °C) (Oral) O2 sat- 94%    --  Documentation assistance provided by migel Prather for Dr. Lester.  Information recorded by the migel was done at my direction and has been verified and validated by me.     Elsa Prather  08/03/17 1606       Elsa Prather  08/03/17 1708       Elsa Prather  08/03/17 1825       Mark Lester MD  08/03/17 2425

## 2017-08-04 PROBLEM — R10.9 ABDOMINAL PAIN: Status: ACTIVE | Noted: 2017-01-01

## 2017-08-04 PROBLEM — J44.9 COPD (CHRONIC OBSTRUCTIVE PULMONARY DISEASE) (HCC): Status: ACTIVE | Noted: 2017-01-01

## 2017-08-04 PROBLEM — F03.90 DEMENTIA (HCC): Status: ACTIVE | Noted: 2017-01-01

## 2017-08-04 PROBLEM — R07.9 CHEST PAIN: Status: ACTIVE | Noted: 2017-01-01

## 2017-08-04 PROBLEM — A41.9 SEPSIS (HCC): Status: ACTIVE | Noted: 2017-01-01

## 2017-08-04 NOTE — PLAN OF CARE
Problem: Patient Care Overview (Adult)  Goal: Plan of Care Review  Outcome: Ongoing (interventions implemented as appropriate)    08/04/17 1650   Coping/Psychosocial Response Interventions   Plan Of Care Reviewed With patient   Patient Care Overview   Progress no change   Outcome Evaluation   Outcome Summary/Follow up Plan abx given, denies nausea, denies pain, tolerating meals, pt confused, family at bedside, inconintent of bladder, pt stable       Goal: Adult Individualization and Mutuality  Outcome: Ongoing (interventions implemented as appropriate)    Problem: Pain, Acute (Adult)  Goal: Identify Related Risk Factors and Signs and Symptoms  Outcome: Ongoing (interventions implemented as appropriate)  Goal: Acceptable Pain Control/Comfort Level  Outcome: Ongoing (interventions implemented as appropriate)    Problem: Sepsis (Adult)  Goal: Signs and Symptoms of Listed Potential Problems Will be Absent or Manageable (Sepsis)  Outcome: Ongoing (interventions implemented as appropriate)

## 2017-08-04 NOTE — PROGRESS NOTES
Discharge Planning Assessment  [facility]     Patient Name: Joselin Aleman  MRN: [id number]  Today's Date: 8/4/2017    Admit Date: 8/3/2017          Discharge Needs Assessment       08/04/17 1535    Living Environment    Lives With facility resident    Living Arrangements assisted living    Home Accessibility no concerns    Stair Railings at Home none    Type of Financial/Environmental Concern none    Transportation Available car;family or friend will provide    Living Environment    Provides Primary Care For no one    Quality Of Family Relationships supportive;helpful;involved    Discharge Needs Assessment    Concerns To Be Addressed discharge planning concerns    Equipment Currently Used at Home wheelchair;walker, rolling    Discharge Facility/Level Of Care Needs nursing facility, skilled    Discharge Disposition skilled nursing facility    Discharge Planning Comments SNF            Discharge Plan       08/04/17 1537    Case Management/Social Work Plan    Plan SNF    Patient/Family In Agreement With Plan yes    Additional Comments IMM letter checked. CCP contacted pt's daughter (Rosetta Aleman, [phone]) to discuss d/c planning. Facesheet verified. CCP role explained. Pt admitted from the [place] where he was reportedly undergoing medication management/adjustment for dementia for the last10 days. Pt resides at [facility] and reportedly uses wheelchair or walker for mobility. Pt's daughter uncertain if pt will be able to return to [facility] due to his reported recent decline in mobility. CCP left [facility] for [facility] to confirm pt's bed status and eligibility to return. Pt's daughter to meet with [facility] today to discuss alternative facility options should pt require LTC on [facility] memory unit. Xiomara Hannon LCSW        Discharge Placement     Facility/Agency Request Status Selected? Address Phone Number Fax Number    [facility] Pending - Request Sent     [address]  [id number] [phone]                Demographic Summary       08/04/17 1534    Referral Information    Admission Type inpatient    Arrived From admitted as an inpatient    Referral Source nursing;physician    Reason For Consult discharge planning    Record Reviewed medical record    Primary Care Physician Information    Name Catarina Strange MD            Functional Status       08/04/17 1534    Functional Status Current    Ambulation 3-->assistive equipment and person    Transferring 3-->assistive equipment and person    Toileting 2-->assistive person    Bathing 2-->assistive person    Dressing 2-->assistive person    Eating 0-->independent    Communication 0-->understands/communicates without difficulty    Swallowing (if score 2 or more for any item, consult Rehab Services) 0-->swallows foods/liquids without difficulty    Functional Status Prior    Ambulation 3-->assistive equipment and person    Transferring 3-->assistive equipment and person    Toileting 2-->assistive person    Bathing 2-->assistive person    Dressing 2-->assistive person    Eating 0-->independent    Communication 0-->understands/communicates without difficulty    Swallowing 0-->swallows foods/liquids without difficulty    Cognitive/Perceptual/Developmental    Current Mental Status/Cognitive Functioning unable to assess            Psychosocial     None            Abuse/Neglect     None            Legal     None            Substance Abuse     None            Patient Forms     None          Lacy Hannon LCSW
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
## 2017-08-04 NOTE — PROGRESS NOTES
"Adult Nutrition  Assessment/PES    Patient Name:  Joselin Aleman  YOB: 1933  MRN: [id number]  Admit Date:  8/3/2017    Assessment Date:  8/4/2017        Reason for Assessment       08/04/17 1156    Reason for Assessment    Reason For Assessment/Visit skin risk    Diagnosis Diagnosis    Infectious Disease UTI    Neurological Dementia    Pulmonary/Critical Care COPD                Anthropometrics       08/04/17 1157    Anthropometrics    RD Documented Current Weight  79.4 kg (175 lb)    Anthropometrics (Special Considerations)    Height Used for Calculations 1.575 m (5' 2.01\")    RD Calculated BMI (kg/m2) 32    Body Mass Index (BMI)    BMI Grade 30 - 34.9- obesity - grade I            Labs/Tests/Procedures/Meds       08/04/17 1158    Labs/Tests/Procedures/Meds    Diagnostic Test/Procedure Review reviewed    Labs/Tests Review Reviewed    Medication Review Reviewed, pertinent;Antibiotic    Significant Vitals reviewed            Physical Findings       08/04/17 1158    Physical Findings/Assessment    Additional Documentation Physical Appearance (Group)    Physical Appearance    Skin pressure ulcer(s)   stage II R hip            Estimated/Assessed Needs       08/04/17 1158    Calculation Measurements    Weight Used For Calculations 79.4 kg (175 lb)    Height Used for Calculations 1.575 m (5' 2.01\")    Estimated/Assessed Energy Needs    Energy Need Method Kcal/kg;Mosheim-St Jeor    Age 84    RMR (Mosheim-St. Jeor Equation) 1363.17    Activity Factors (M[place] St or)  Confined to bed  1.2    Estimated Kcal Range  7829-2282    Estimated/Assessed Protein Needs    Weight Used for Protein Calculation 79.4 kg (175 lb)    Protein (gm/kg) 1.4    1.4 Gm Protein (gm) 111.13    Estimated Protein Range 103-110    Estimated/Assessed Fluid Needs    Fluid Need Method RDA method    RDA Method (mL)  1700            Nutrition Prescription Ordered       08/04/17 1159    Nutrition Prescription PO    Current PO Diet Regular "            Evaluation of Received Nutrient/Fluid Intake       08/04/17 1200    PO Evaluation    Number of Meals 1    % PO Intake 50%              Problem/Interventions:        Problem 1       08/04/17 1200    Nutrition Diagnoses Problem 1    Problem 1 Predicted Suboptimal Intake    Etiology (related to) Factors Affecting Nutrition;Medical Diagnosis    Neurological Dementia    Skin Pressure ulcer    Signs/Symptoms (evidenced by) PO Intake    Percent (%) intake recorded 50 %    Over number of meals 1                    Intervention Goal       08/04/17 1201    Intervention Goal    PO Tolerate PO;PO intake (%)    PO Intake % 100 %    Weight No significant weight loss            Nutrition Intervention       08/04/17 1201    Nutrition Intervention    RD/Tech Action Care plan reviewd;Follow Tx progress;Encourage intake            Nutrition Prescription       08/04/17 1201    Nutrition Prescription PO    PO Prescription Begin/change supplement    Supplement Ensure Enlive    Supplement Frequency 3 times a day    New PO Prescription Ordered? Yes    Other Orders    Supplement Vitamin mineral supplement            Education/Evaluation       08/04/17 1201    Monitor/Evaluation    Monitor Per protocol;PO intake;Supplement intake;Skin status        Comments:  Assessment completed due to PU R hip. Intake 50% so far of Regular diet. Will add Ensure Enlive at meals three time per day and follow for intake.     Electronically signed by:  Karin Petersen RD  08/04/17 12:03 PM

## 2017-08-04 NOTE — DISCHARGE PLACEMENT REQUEST
"Shahida Rico (84 y.o. Male)     Date of Birth Social Security Number Address Home Phone MRN    05/06/1933  [address] [place], [place] [id number] [phone] [id number]    [employer] Marital Status          Buddhist Legally        Admission Date Admission Type Admitting Provider Attending Provider Department, Room/Bed    8/3/17 Emergency [place], MD Enoch Goldberg, Juan Diego Clark MD [address], 629/1    Discharge Date Discharge Disposition Discharge Destination                      Attending Provider: Juan Diego Kendall MD     Allergies:  Prednisone    Isolation:  None   Infection:  None   Code Status:  FULL    Ht:  62\" (157.5 cm)   Wt:  175 lb 6.4 oz (79.6 kg)    Admission Cmt:  None   Principal Problem:  Sepsis [A41.9]                 Active Insurance as of 8/3/2017     Primary Coverage     Payor Plan Insurance Group Employer/Plan Group    MEDICARE MEDICARE A & B      Payor Plan Address Payor Plan Phone Number Effective From Effective To    [address] [phone] 5/1/1998     [place] [address]       Subscriber Name Subscriber Birth Date Member ID       SHAHIDA RICO 5/6/1933 [id number]           Secondary Coverage     Payor Plan Insurance Group Employer/Plan Group     FOR LIFE  FOR LIFE  SUPP      Payor Plan Address Payor Plan Phone Number Effective From Effective To    [address] [phone] 8/3/2017     [place] [address]       Subscriber Name Subscriber Birth Date Member ID       SHAHIDA RICO 5/6/1933 [id number]                 Emergency Contacts      (Rel.) Home Phone Work Phone Mobile Phone    Rosetta Rico (Poa) (Daughter) [phone] -- [phone]              "

## 2017-08-04 NOTE — NURSING NOTE
Received referral r/t stage 2 right hip.  Pt has tiny small dried scab which was probably a scratch or small abrasion; not a pressure ulcer.

## 2017-08-04 NOTE — SIGNIFICANT NOTE
08/04/17 0824   Rehab Treatment   Discipline speech language pathologist   Rehab Evaluation   Evaluation Not Performed other (see comments)  (RN entered database referral as order.  Spoke with covering RN who will relay msg to obtain MD order if pt appropriate for swallow eval.)

## 2017-08-04 NOTE — PROGRESS NOTES
"Pharmacokinetic Evaluation - Vancomycin    Joselin Aleman is a 84 y.o. male on vancomycin pharmacy to dose.  MRN: [id number]  : 1933    Day of vancomycin therapy: 1  Indication: bacteremia  Consulted by: Dr. Kendall  Goal trough: 15-20    Current dose: new start  Other antimicrobials: ceftriaxone    Blood pressure 148/73, pulse 86, temperature 98.2 °F (36.8 °C), temperature source Oral, resp. rate 14, height 62\" (157.5 cm), weight 175 lb 6.4 oz (79.6 kg), SpO2 95 %.    Results from last 7 days     Lab Units 17  1544   CREATININE mg/dL 1.28*   Estimated Creatinine Clearance: 39.3 mL/min (by C-G formula based on Cr of 1.28).    Results from last 7 days     Lab Units 17  0622 17  1544   WBC 10*3/mm3 9.30 14.12*   HEMOGLOBIN g/dL 10.2* 10.4*   HEMATOCRIT % 31.2* 33.3*   PLATELETS 10*3/mm3 230 264     Other relevant labs/chart info:      Radiology:   6/3 cxr: . Bibasilar atelectasis/infiltrate is  noted.  Cultures:   8/3 blood cx: staph not aureus in 1/2   8/3 ucx: pending     Dosing hx (include troughs if drawn):  None available     Assessment:  Renal function is poor .    Plan:  1) Initiate vancomycin 1000 mg once, intermittent for now  2) Vanc random and Scr in am     Beverly Means, PharmD, BCPS  2017 4:18 PM    "

## 2017-08-04 NOTE — PLAN OF CARE
Problem: Pain, Acute (Adult)  Goal: Acceptable Pain Control/Comfort Level  Outcome: Ongoing (interventions implemented as appropriate)    08/03/17 1581   Pain, Acute (Adult)   Acceptable Pain Control/Comfort Level making progress toward outcome

## 2017-08-04 NOTE — PROGRESS NOTES
Continued Stay Note  [facility]     Patient Name: Joselin Aleman  MRN: [id number]  Today's Date: 8/4/2017    Admit Date: 8/3/2017          Discharge Plan       08/04/17 1715    Case Management/Social Work Plan    Plan SNF    Additional Comments [facility] met with pt's daughter (Rosetta Aleman, [phone]) and son to discuss d/c planning. Pt's daughter remains uncertain about whether pt will be able to return to [facility], where he currently resides in private pay memory care/personal care. [facility] continues to await return contact from [facility] to verify pt's eligibility to return. CCP provided daughter list of facilities with locked memory units should pt require higher level of care. Pt's daughter requests referrals to [place] (Christine) and [place] (Meeta). [facility] made referrals and will follow for evals . Xiomara Hannon LCSW                  Discharge Codes     None            Lacy Hannon LCSW

## 2017-08-04 NOTE — PLAN OF CARE
Problem: Nutrition, Imbalanced: Inadequate Oral Intake (Adult)  Intervention: Promote/Optimize Nutrition    08/04/17 1204   Nutrition Interventions   Oral Nutrition Promotion calorie dense foods provided;calorie dense liquids provided      Encourage intake and offer supplements.

## 2017-08-04 NOTE — PROGRESS NOTES
Continued Stay Note  [facility]     Patient Name: Joselin Aleman  MRN: [id number]  Today's Date: 8/4/2017    Admit Date: 8/3/2017          Discharge Plan       08/04/17 1721    Case Management/Social Work Plan    Additional Comments Per Stacey, pt cannot return to [facility] personal care/memory care because he requires a higher level of care, and [facility] does not have a sub-acute rehab  or LTC bed to accommodate pt. CCP to follow for [place] and [facility]. Xiomara Hannon LCSW           Discharge Codes     None            Lacy Hannon LCSW

## 2017-08-04 NOTE — H&P
HISTORY AND PHYSICAL   [facility]        Patient Identification:  Name: Joselin Aleman  Age: 84 y.o.  Sex: male  :  1933  MRN: [id number]                     Primary Care Physician: Catarina Strange MD    Chief Complaint:  Fever    History of Present Illness:   84-year-old gentleman with history of dementia with behavioral disturbance.  He was actually at a psychiatric facility when he was noted be afebrile.  According to the emergency room notes his daughter had noted that he did complain of abdominal pain.  History comes from discussion with emergency room staff as well as spinal records.  The patient himself does does not know why he is here.          Past Medical History:  Past Medical History:   Diagnosis Date   • COPD (chronic obstructive pulmonary disease)    • Dementia      Past Surgical History:  History reviewed. No pertinent surgical history.   Home Meds:  Prescriptions Prior to Admission   Medication Sig Dispense Refill Last Dose   • CloNIDine (CATAPRES) 0.1 MG tablet Take 0.1 mg by mouth Every 4 (Four) Hours As Needed for High Blood Pressure (blood pressure >150/90).      • divalproex (DEPAKOTE) 250 MG DR tablet Take 250 mg by mouth 4 (Four) Times a Day.      • felodipine (PLENDIL) 10 MG 24 hr tablet Take 10 mg by mouth 2 (Two) Times a Day.      • fluticasone (FLONASE) 50 MCG/ACT nasal spray 1 spray into each nostril Daily.      • fluticasone-salmeterol (ADVAIR) 500-50 MCG/DOSE DISKUS Inhale 1 puff 2 (Two) Times a Day.      • guaiFENesin (MUCINEX) 600 MG 12 hr tablet Take 600 mg by mouth 2 (Two) Times a Day.      • haloperidol (HALDOL) 1 MG tablet Take 3 mg by mouth Every Night.      • haloperidol (HALDOL) 2 MG tablet Inject 2 mg into the shoulder, thigh, or buttocks 4 (Four) Times a Day As Needed for Agitation.      • hydrALAZINE (APRESOLINE) 25 MG tablet Take 75 mg by mouth 3 (Three) Times a Day.      • ibuprofen (ADVIL,MOTRIN) 800 MG tablet Take 800 mg by mouth 3 (Three)  Times a Day As Needed.      • ipratropium-albuterol (DUO-NEB) 0.5-2.5 mg/mL nebulizer Take 3 mL by nebulization 4 (Four) Times a Day As Needed for Wheezing.      • melatonin 5 MG tablet tablet Take 6 mg by mouth Every Night. Takes 6mg      • memantine (NAMENDA) 5 MG tablet Take 5 mg by mouth Every Night.      • mirtazapine (REMERON) 7.5 MG half tablet Take 7.5 mg by mouth Every Night.      • montelukast (SINGULAIR) 10 MG tablet Take 10 mg by mouth Every Night.      • potassium chloride (MICRO-K) 10 MEQ CR capsule Take 10 mEq by mouth Daily.      • QUEtiapine (SEROquel) 25 MG tablet Take 50 mg by mouth Every Night.      • QUEtiapine (SEROquel) 25 MG tablet Take 12.5 mg by mouth Daily.      • QUEtiapine (SEROquel) 25 MG tablet Take 25 mg by mouth Daily. Daily at 1600      • senna (SENOKOT) 8.6 MG tablet tablet Take 1 tablet by mouth Daily.      • vitamin B-12 (CYANOCOBALAMIN) 1000 MCG tablet Take 1,000 mcg by mouth Daily.      • cyclobenzaprine (FLEXERIL) 5 MG tablet Take 5 mg by mouth Daily.          Allergies:  Allergies   Allergen Reactions   • Prednisone      Immunizations:  Immunization History   Administered Date(s) Administered   • Influenza, Quadrivalent 10/01/2016   • Pneumococcal Polysaccharide 10/01/2016     Social History:   Social History     Social History Narrative   • No narrative on file     Social History   Substance Use Topics   • Smoking status: Former Smoker     Types: Cigarettes   • Smokeless tobacco: Not on file   • Alcohol use No     Family History:  History reviewed. No pertinent family history.     Review of Systems  Review of Systems   Unable to perform ROS: Dementia       Objective:  tMax 24 hrs: Temp (24hrs), Av.6 °F (37.6 °C), Min:98.3 °F (36.8 °C), Max:102.3 °F (39.1 °C)    Vitals Ranges:   Temp:  [98.3 °F (36.8 °C)-102.3 °F (39.1 °C)] 98.3 °F (36.8 °C)  Heart Rate:  [] 76  Resp:  [14-18] 18  BP: (120-184)/(64-91) 184/91      Exam:  Physical Exam   Constitutional: He appears  "well-developed and well-nourished. No distress.   HENT:   Head: Normocephalic and atraumatic.   Right Ear: External ear normal.   Left Ear: External ear normal.   Nose: Nose normal.   Mucous membranes moderately dry.   Eyes: Conjunctivae and EOM are normal. Right eye exhibits no discharge. Left eye exhibits no discharge. No scleral icterus.   Neck: Neck supple. No JVD present. No tracheal deviation present. No thyromegaly present.   Cardiovascular: Normal rate, regular rhythm and intact distal pulses.    Pulmonary/Chest: Effort normal and breath sounds normal. No stridor. No respiratory distress. He exhibits no tenderness.   Abdominal: Soft. Bowel sounds are normal. He exhibits no distension and no mass. There is no tenderness. There is no rebound and no guarding.   Musculoskeletal: He exhibits no edema or deformity.   Neurological: He is alert.   Patient was oriented to person and fact he was at \"[employer]\".  However he could not state why he was here.  He would periodically ramble on subject that were not connected to the discussion at hand such as his uncle playing music etc.   Skin: Skin is warm and dry. He is not diaphoretic.   Psychiatric:   Please see neurologic evaluation.       Data Review:  All labs and radiology reviewed.    Assessment:  Principal Problem:    Sepsis  Active Problems:    Acute UTI    COPD (chronic obstructive pulmonary disease)    Dementia      Plan:  Patient is been admitted and started on Rocephin.  We'll continue on with this pending culture results.  Gently hydrate him overnight.  For further details please see orders.  The patient does have a living will.  However on is not able to make contact with his daughter.  I did leave a message on her answering machine.    Satinder Tejada MD  8/4/2017  12:25 AM    EMR Dragon/Transcription disclaimer:   Much of this encounter note is an electronic transcription/translation of spoken language to printed text. The electronic translation of " spoken language may permit erroneous, or at times, nonsensical words or phrases to be inadvertently transcribed; Although I have reviewed the note for such errors, some may still exist.

## 2017-08-05 PROBLEM — Z66 DNR (DO NOT RESUSCITATE): Status: ACTIVE | Noted: 2017-01-01

## 2017-08-05 PROBLEM — R10.9 ABDOMINAL PAIN: Status: RESOLVED | Noted: 2017-01-01 | Resolved: 2017-01-01

## 2017-08-05 PROBLEM — R77.8 ELEVATED TROPONIN: Status: ACTIVE | Noted: 2017-01-01

## 2017-08-05 PROBLEM — R78.81 BACTEREMIA: Status: RESOLVED | Noted: 2017-01-01 | Resolved: 2017-01-01

## 2017-08-05 PROBLEM — R07.9 CHEST PAIN: Status: RESOLVED | Noted: 2017-01-01 | Resolved: 2017-01-01

## 2017-08-05 PROBLEM — R78.81 BACTEREMIA: Status: ACTIVE | Noted: 2017-01-01

## 2017-08-05 NOTE — THERAPY EVALUATION
Acute Care - Physical Therapy Initial Evaluation  [facility]     Patient Name: Joselin Aleman  : 1933  MRN: [id number]  Today's Date: 2017   Onset of Illness/Injury or Date of Surgery Date: 17            Admit Date: 8/3/2017     Visit Dx:    ICD-10-CM ICD-9-CM   1. Acute UTI N39.0 599.0   2. Fever in adult R50.9 780.60     Patient Active Problem List   Diagnosis   • Acute UTI   • Sepsis   • COPD (chronic obstructive pulmonary disease)   • Dementia   • Chest pain   • Abdominal pain   • DNR (do not resuscitate)   • Elevated troponin     Past Medical History:   Diagnosis Date   • COPD (chronic obstructive pulmonary disease)    • Dementia      History reviewed. No pertinent surgical history.       PT ASSESSMENT (last 72 hours)      PT Evaluation       17 1506 17 7814    Rehab Evaluation    Document Type evaluation  -AR     Subjective Information agree to therapy  -AR     Patient Effort, Rehab Treatment adequate  -AR     Symptoms Noted During/After Treatment fatigue  -AR     General Information    Patient Profile Review yes  -AR     Onset of Illness/Injury or Date of Surgery Date 17  -AR     Pertinent History Of Current Problem Alzheimer's w/ h/o aggressive behavior  -AR     Precautions/Limitations fall precautions  -AR     Prior Level of Function --   frequent falls on memory care unit  -AR     Barriers to Rehab none identified  -AR     Living Environment    Lives With facility resident  -AR     Living Arrangements  assisted living  -PM    Living Environment Comment memory care unit  -AR     Clinical Impression    Patient/Family Goals Statement does not state  -AR     Criteria for Skilled Therapeutic Interventions Met yes  -AR     Pathology/Pathophysiology Noted (Describe Specifically for Each System) musculoskeletal  -AR     Impairments Found (describe specific impairments) aerobic capacity/endurance;gait, locomotion, and balance  -AR     Rehab Potential good, to achieve stated  therapy goals  -AR     Pain Assessment    Pain Assessment --   denies pn several times  -AR     Cognitive Assessment/Intervention    Current Cognitive/Communication Assessment impaired  -AR     Orientation Status disoriented to;person;place   thinks son is his brother  -AR     Follows Commands/Answers Questions 75% of the time;able to follow single-step instructions;needs cueing;needs increased time;needs repetition  -AR     Personal Safety mild impairment  -AR     ROM (Range of Motion)    General ROM --   B LE WFL  -AR     MMT (Manual Muscle Testing)    General MMT Assessment --   unable to perform MMT 2/2 cog.  +3/5 during mobility  -AR     Bed Mobility, Assessment/Treatment    Bed Mobility, Assistive Device bed rails;head of bed elevated  -AR     Bed Mob, Supine to Sit, [place] minimum assist (75% patient effort)   frequent cues and prompting  -AR     Bed Mob, Sit to Supine, [place] moderate assist (50% patient effort)  -AR     Transfer Assessment/Treatment    Transfers, Sit-Stand [place] minimum assist (75% patient effort);moderate assist (50% patient effort);2 person assist required  -AR     Transfers, Stand-Sit [place] minimum assist (75% patient effort);2 person assist required  -AR     Transfers, Sit-Stand-Sit, Assist Device --   HHA   -AR     Transfer, Comment sit<>stand 2x  -AR     Gait Assessment/Treatment    Gait, [place] Level minimum assist (75% patient effort);2 person assist required  -AR     Gait, Assistive Device --   HHA x2  -AR     Gait, Distance (Feet) 1  -AR     Gait, Comment few steps towards HOB  -AR     Therapy Exercises    Bilateral Lower Extremities LAQ;hip abduction/adduction;10 reps  -AR     Positioning and Restraints    Pre-Treatment Position in bed  -AR     Post Treatment Position bed  -AR     In Bed supine;call light within reach;encouraged to call for assist;exit alarm on  -AR       08/04/17 1535 08/04/17 0824    Rehab Evaluation    Evaluation Not Performed   other (see comments)   RN entered database referral as order.  Spoke with covering RN who will relay msg to obtain MD order if pt appropriate for swallow eval.  -SA    General Information    Equipment Currently Used at Home wheelchair;walker, rolling  -     Living Environment    Lives With facility resident  -     Living Arrangements assisted living  -     Home Accessibility no concerns  -JM     Stair Railings at Home none  -JM     Type of Financial/Environmental Concern none  -JM     Transportation Available car;family or friend will provide  -       08/03/17 2000       General Information    Equipment Currently Used at Home none  -JW     Living Environment    Lives With facility resident  -JW     Living Arrangements extended care facility  -JW     Home Accessibility no concerns  -JW     Stair Railings at Home none  -JW     Type of Financial/Environmental Concern none  -JW     Transportation Available car  -       User Key  (r) = Recorded By, (t) = Taken By, (c) = Cosigned By    Initials Name Provider Type    SA Lissa Montesinos MS CCC-SLP Speech and Language Pathologist    CHUCHO Limon, RN Registered Nurse    RACHNA Neves, PT Physical Therapist    PM Kindra Yi, RN Registered Nurse    SHON Hannon LCSW           Physical Therapy Education     Title: PT OT SLP Therapies (Active)     Topic: Physical Therapy (Active)     Point: Mobility training (Active)    Learning Progress Summary    Learner Readiness Method Response Comment Documented by Status   Patient Acceptance E NR  AR 08/05/17 1515 Active               Point: Home exercise program (Active)    Learning Progress Summary    Learner Readiness Method Response Comment Documented by Status   Patient Acceptance E NR  AR 08/05/17 1515 Active               Point: Body mechanics (Active)    Learning Progress Summary    Learner Readiness Method Response Comment Documented by Status   Patient Acceptance E NR  AR 08/05/17  1515 Active               Point: Precautions (Active)    Learning Progress Summary    Learner Readiness Method Response Comment Documented by Status   Patient Acceptance E NR  AR 08/05/17 1515 Active                      User Key     Initials Effective Dates Name Provider Type Discipline    AR 06/27/16 -  Reema Neves PT Physical Therapist PT                PT Recommendation and Plan  Anticipated Discharge Disposition: skilled nursing facility, extended care facility  Planned Therapy Interventions: balance training, bed mobility training, gait training, home exercise program, patient/family education, ROM (Range of Motion), stair training, strengthening  PT Frequency: 2-3 times/wk  Plan of Care Review  Plan Of Care Reviewed With: patient  Outcome Summary/Follow up Plan: Pt presents to PT from memory care unit w/ Alzheimer's dementia and UTI.  Pt demonstrates generalized weakness, impaired standing balance, and impaired gait pattern requiring assist x2 for safety.  Recommend DC to [facility].             IP PT Goals       08/05/17 1513          Bed Mobility PT LTG    Bed Mobility PT LTG, Date Established 08/05/17  -AR      Bed Mobility PT LTG, Time to Achieve 1 wk  -AR      Bed Mobility PT LTG, Activity Type supine to sit/sit to supine  -AR      Bed Mobility PT LTG, [place] Level supervision required  -AR      Transfer Training PT LTG    Transfer Training PT LTG, Date Established 08/05/17  -AR      Transfer Training PT LTG, Time to Achieve 1 wk  -AR      Transfer Training PT LTG, Activity Type sit to stand/stand to sit;bed to chair /chair to bed  -AR      Transfer Training PT LTG, [place] Level contact guard assist  -AR      Transfer Training PT LTG, Assist Device walker, rolling  -AR      Gait Training PT LTG    Gait Training Goal PT LTG, Date Established 08/05/17  -AR      Gait Training Goal PT LTG, Time to Achieve 1 wk  -AR      Gait Training Goal PT LTG, [place] Level minimum assist (75% patient  effort);2 person assist required  -AR      Gait Training Goal PT LTG, Assist Device walker, rolling  -AR      Gait Training Goal PT LTG, Distance to Achieve 50  -AR        User Key  (r) = Recorded By, (t) = Taken By, (c) = Cosigned By    Initials Name Provider Type    RACHNA Neves PT Physical Therapist                Outcome Measures       08/05/17 1500          How much help from another person do you currently need...    Turning from your back to your side while in flat bed without using bedrails? 3  -AR      Moving from lying on back to sitting on the side of a flat bed without bedrails? 3  -AR      Moving to and from a bed to a chair (including a wheelchair)? 3  -AR      Standing up from a chair using your arms (e.g., wheelchair, bedside chair)? 3  -AR      Climbing 3-5 steps with a railing? 1  -AR      To walk in hospital room? 2  -AR      AM-PAC 6 Clicks Score 15  -AR      Functional Assessment    Outcome Measure Options AM-PAC 6 Clicks Basic Mobility (PT)  -AR        User Key  (r) = Recorded By, (t) = Taken By, (c) = Cosigned By    Initials Name Provider Type    RACHNA Neves PT Physical Therapist           Time Calculation:         PT Charges       08/05/17 1516          Time Calculation    Start Time 1447  -AR      Stop Time 1516  -AR      Time Calculation (min) 29 min  -AR      PT Received On 08/05/17  -AR      PT - Next Appointment 08/08/17  -AR      PT Goal Re-Cert Due Date 08/19/17  -AR        User Key  (r) = Recorded By, (t) = Taken By, (c) = Cosigned By    Initials Name Provider Type    RACHNA Neves PT Physical Therapist              PT G-Codes  Outcome Measure Options: AM-PAC 6 Clicks Basic Mobility (PT)      Reema Neves PT  8/5/2017
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
## 2017-08-05 NOTE — PLAN OF CARE
Problem: Patient Care Overview (Adult)  Goal: Plan of Care Review  Outcome: Ongoing (interventions implemented as appropriate)    08/05/17 0507   Coping/Psychosocial Response Interventions   Plan Of Care Reviewed With patient   Patient Care Overview   Progress progress towards functional goals is fair   Outcome Evaluation   Outcome Summary/Follow up Plan pt confused and resting comfortably. no complaints at this time. family in toom. vs are stable. no acute distress noted. will continue to monitor.       Goal: Adult Individualization and Mutuality  Outcome: Ongoing (interventions implemented as appropriate)    Problem: Pain, Acute (Adult)  Goal: Acceptable Pain Control/Comfort Level  Outcome: Ongoing (interventions implemented as appropriate)    Problem: Sepsis (Adult)  Goal: Signs and Symptoms of Listed Potential Problems Will be Absent or Manageable (Sepsis)  Outcome: Ongoing (interventions implemented as appropriate)    Problem: Nutrition, Imbalanced: Inadequate Oral Intake (Adult)  Goal: Improved Oral Intake  Outcome: Ongoing (interventions implemented as appropriate)  Goal: Prevent Further Weight Loss  Outcome: Ongoing (interventions implemented as appropriate)
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
## 2017-08-05 NOTE — PROGRESS NOTES
"Pharmacokinetic Consult - Vancomycin Dosing (Follow-up Note)    Joselin Aleman is on day 2 pharmacy to dose vancomycin for bacteremia.  Consult for Dr. Kendall  Goal trough: 15-20 mg/L     Relevant clinical data and objective history reviewed:  84 y.o. male 62\" (157.5 cm) 176 lb (79.8 kg)    Past Medical History:   Diagnosis Date   • COPD (chronic obstructive pulmonary disease)    • Dementia      Creatinine   Date Value Ref Range Status   08/05/2017 0.94 0.76 - 1.27 mg/dL Final   08/03/2017 1.28 (H) 0.76 - 1.27 mg/dL Final     BUN   Date Value Ref Range Status   08/05/2017 15 8 - 23 mg/dL Final     Estimated Creatinine Clearance: 53.5 mL/min (by C-G formula based on Cr of 0.94).    Lab Results   Component Value Date    WBC 7.72 08/05/2017     Temp Readings from Last 3 Encounters:   08/05/17 98.2 °F (36.8 °C) (Oral)       IV Anti-Infectives     Ordered     Dose/Rate Route Frequency Start Stop    08/05/17 1042  vancomycin 750 mg/250 mL 0.9% NS add-vantage     Ordering Provider:  Juan Diego Kendall MD    10 mg/kg × 79.8 kg Intravenous Every 12 Hours 08/05/17 1130      08/04/17 0037  cefTRIAXone (ROCEPHIN) IVPB 1 g     Ordering Provider:  Satinder Tejada MD    1 g  over 30 Minutes Intravenous Every 24 Hours 08/04/17 1800      08/04/17 1614  Pharmacy to dose vancomycin     Ordering Provider:  Juan Diego Kendall MD     Does not apply Continuous PRN 08/04/17 1613           Lab Results   Component Value Date    VANCORANDOM 8.50 08/05/2017     Assessment/Plan  Patient received vancomycin 20mg/kg IV x 1 dose yesterday evening. Random level ordered for this morning, just resulted as 8.5mcg/ml.  Renal function improved, thus will start patient on scheduled vancomycin regimen.  Ordered vancomycin 750mg (10mg/kg) IV q12h. Vancomycin level ordered prior to dose tomorrow morning.  Pharmacy will continue to follow daily while on vancomycin and adjust accordingly.    Lorena Marquez, PharmD  8/5/2017  10:46 AM      "

## 2017-08-05 NOTE — PLAN OF CARE
Problem: Patient Care Overview (Adult)  Goal: Plan of Care Review  Outcome: Ongoing (interventions implemented as appropriate)    08/05/17 1605   Coping/Psychosocial Response Interventions   Plan Of Care Reviewed With patient   Patient Care Overview   Progress improving   Outcome Evaluation   Outcome Summary/Follow up Plan VSS. Confused. Trying to get out of bed multiple times throughout shift. Re-orientation provided. Distraction provided. Bed alarm on. Family at bedside. Psych consulted. Replaced potassium. Conditional code. Continue to monitor.       Goal: Adult Individualization and Mutuality  Outcome: Ongoing (interventions implemented as appropriate)  Goal: Discharge Needs Assessment  Outcome: Ongoing (interventions implemented as appropriate)    Problem: Pain, Acute (Adult)  Goal: Identify Related Risk Factors and Signs and Symptoms  Outcome: Outcome(s) achieved Date Met:  08/05/17  Goal: Acceptable Pain Control/Comfort Level  Outcome: Ongoing (interventions implemented as appropriate)    Problem: Sepsis (Adult)  Goal: Signs and Symptoms of Listed Potential Problems Will be Absent or Manageable (Sepsis)  Outcome: Ongoing (interventions implemented as appropriate)    Problem: Nutrition, Imbalanced: Inadequate Oral Intake (Adult)  Goal: Identify Related Risk Factors and Signs and Symptoms  Outcome: Outcome(s) achieved Date Met:  08/05/17  Goal: Improved Oral Intake  Outcome: Ongoing (interventions implemented as appropriate)  Goal: Prevent Further Weight Loss  Outcome: Ongoing (interventions implemented as appropriate)    Problem: Fall Risk (Adult)  Goal: Identify Related Risk Factors and Signs and Symptoms  Outcome: Outcome(s) achieved Date Met:  08/05/17  Goal: Absence of Falls  Outcome: Ongoing (interventions implemented as appropriate)

## 2017-08-05 NOTE — PROGRESS NOTES
Name: Joselin Aleman ADMIT: 8/3/2017   : 1933  PCP: Catarina Strange MD    MRN: [id number] LOS: 2 days   AGE/SEX: 84 y.o. male  ROOM: [facility]   Subjective   Subjective  Cc- weakness  Generalized weakness  Denies on CP today  On IV abx  Worked with PT  Upset with his son at bedside. Thinks it is his brother    ROS  No f/c  No n/v  No cp/palp  No soa/cough  Objective   Vital Signs  Temp:  [97.8 °F (36.6 °C)-98.6 °F (37 °C)] 97.8 °F (36.6 °C)  Heart Rate:  [73-86] 73  Resp:  [14-18] 16  BP: (135-163)/(63-92) 135/63  SpO2:  [92 %-96 %] 94 %  on  Flow (L/min):  [2] 2;   O2 Device: room air  Body mass index is 32.19 kg/(m^2).    Physical Exam   Constitutional: No distress.   elderly   HENT:   Head: Normocephalic and atraumatic.   Mouth/Throat: Oropharynx is clear and moist.   Eyes: Conjunctivae are normal. No scleral icterus.   Neck: Normal range of motion. Neck supple.   Cardiovascular: Normal rate, regular rhythm and normal heart sounds.    No murmur heard.  Pulmonary/Chest: Effort normal and breath sounds normal. No respiratory distress.   Abdominal: Soft. Bowel sounds are normal. There is no tenderness.   Genitourinary:   Genitourinary Comments: Deferred    Musculoskeletal: He exhibits no edema or deformity.   Neurological: He is alert.   Skin: Skin is warm and dry.   Nursing note and vitals reviewed.      Results Review:       I reviewed the patient's new clinical results.    Results from last 7 days  Lab Units 17  0933 17  0622 17  1544   WBC 10*3/mm3 7.72 9.30 14.12*   HEMOGLOBIN g/dL 10.8* 10.2* 10.4*   PLATELETS 10*3/mm3 234 230 264       Results from last 7 days  Lab Units 17  0933 17  1544   SODIUM mmol/L 136 138   POTASSIUM mmol/L 3.4* 4.1   CHLORIDE mmol/L 100 101   CO2 mmol/L 22.1 22.6   BUN mg/dL 15 24*   CREATININE mg/dL 0.94 1.28*   GLUCOSE mg/dL 124* 119*   Estimated Creatinine Clearance: 53.5 mL/min (by C-G formula based on Cr of 0.94).    Results from last 7  days  Lab Units 08/05/17  0933 08/04/17  1235 08/03/17  1544   CALCIUM mg/dL 8.8  --  8.9   ALBUMIN g/dL  --   --  3.40*   MAGNESIUM mg/dL  --  2.0  --      Blood Culture [[id number]] (Normal) Collected: 08/04/17 2118        Lab Status: Preliminary result Specimen: Blood from Arm, Left Updated: 08/05/17 1001        Blood Culture No growth at less than 24 hours       Blood Culture [[id number]] (Normal) Collected: 08/04/17 1729       Lab Status: Preliminary result Specimen: Blood from Arm, Right Updated: 08/05/17 0601        Blood Culture No growth at less than 24 hours       Urine Culture [[id number]] (Abnormal) Collected: 08/03/17 1738       Lab Status: Preliminary result Specimen: Urine from Urine, Catheter Updated: 08/05/17 0938        Urine Culture >100,000 CFU/mL Gram Positive Cocci (A)       Blood Culture [[id number]] (Normal) Collected: 08/03/17 1623       Lab Status: Preliminary result Specimen: Blood from Arm, Left Updated: 08/04/17 1701        Blood Culture No growth at 24 hours       Blood Culture [[id number]] (Abnormal) Collected: 08/03/17 1615       Lab Status: Final result Specimen: Blood from Arm, Right Updated: 08/05/17 0824        Blood Culture Staphylococcus, coagulase negative (A)         Probable contaminant requires clinical correlation, susceptibility not performed unless requested by physician.              Gram Stain Result Anaerobic Bottle Gram positive cocci in clusters       Blood Culture ID, PCR [[id number]] (Abnormal) Collected: 08/03/17 1615       Lab Status: Final result Specimen: Blood from Arm, Right Updated: 08/04/17 1612        BCID, PCR Staphylococcus spp, not aureus. Identification by BCID PCR. (A)           XR Chest 1 View [[id number]] Not Reviewed        Order Status: Completed Collected: 08/03/17 1748        Updated: 08/04/17 1302       Narrative:         PORTABLE CHEST     HISTORY: Fever.     FINDINGS: The patient is rotated to the right. There is likely  mild-to-moderate  cardiomegaly. Bibasilar atelectasis/infiltrate is  noted. There is a suggestion of pleural fluid on the right. There is  mild prominence of the pulmonary interstitium and perihilar regions  bilaterally, exaggerated by patient positioning and rotation.               aspirin 81 mg Oral Daily   budesonide-formoterol 2 puff Inhalation BID - RT   ceftriaxone 1 g Intravenous Q24H   cyclobenzaprine 5 mg Oral Daily   divalproex 250 mg Oral 4x Daily   enoxaparin 40 mg Subcutaneous Daily   famotidine 10 mg Intravenous Q12H   felodipine 10 mg Oral Q12H   fluticasone 1 spray Nasal Daily   guaiFENesin 600 mg Oral BID   hydrALAZINE 75 mg Oral Q8H   melatonin 5 mg Oral Nightly   memantine 5 mg Oral Nightly   mirtazapine 7.5 mg Oral Nightly   montelukast 10 mg Oral Nightly   potassium chloride 10 mEq Oral Daily   QUEtiapine 50 mg Oral Nightly   senna 1 tablet Oral Daily   vancomycin 10 mg/kg Intravenous Q12H   vitamin B-12 1,000 mcg Oral Daily       Pharmacy to dose vancomycin    Diet Regular      Assessment/Plan   Assessment:     Active Hospital Problems (** Indicates Principal Problem)    Diagnosis Date Noted   • **Sepsis [A41.9] 08/04/2017   • DNR (do not resuscitate) [Z66] 08/05/2017   • Elevated troponin [R79.89] 08/05/2017   • Bacteremia [R78.81] 08/05/2017   • COPD (chronic obstructive pulmonary disease) [J44.9] 08/04/2017   • Dementia [F03.90] 08/04/2017   • Chest pain [R07.9] 08/04/2017   • Abdominal pain [R10.9] 08/04/2017   • Acute UTI [N39.0] 08/03/2017      Resolved Hospital Problems    Diagnosis Date Noted Date Resolved   No resolved problems to display.       Plan:   - IV abx- though will stop Vanc as likely BC was contaminant follow up repeat cultures  - No additional chest pain. Indeterminate troponin. EKG normal. Check ECHO and have on asa  - DNR/DNI- confirmed with son at bedside and with patient  - Cancel palliative care consult as not appropriate. Son at bedside wishes for better behavior and then eventual  transition to SNF. I think he would be a good candidate for the [facility] depending how he does today-tomorrow.    D/W family  D/W RN  D/w Dr. Holly  Reviewed previous records      Disposition  Possibly [facility] tomorrow on po abx. CCP also looking into subacute rehabs.      Juan Diego Kendall MD  [facility]  08/05/17  3:20 PM

## 2017-08-05 NOTE — PLAN OF CARE
Problem: Patient Care Overview (Adult)  Goal: Plan of Care Review    08/05/17 1513   Coping/Psychosocial Response Interventions   Plan Of Care Reviewed With patient   Outcome Evaluation   Outcome Summary/Follow up Plan Pt presents to PT from memory care unit w/ Alzheimer's dementia and UTI. Pt demonstrates generalized weakness, impaired standing balance, and impaired gait pattern requiring assist x2 for safety. Recommend DC to [facility].          Problem: Inpatient Physical Therapy  Goal: Bed Mobility Goal LTG- PT    08/05/17 1513   Bed Mobility PT LTG   Bed Mobility PT LTG, Date Established 08/05/17   Bed Mobility PT LTG, Time to Achieve 1 wk   Bed Mobility PT LTG, Activity Type supine to sit/sit to supine   Bed Mobility PT LTG, [place] Level supervision required       Goal: Transfer Training Goal 1 LTG- PT    08/05/17 1513   Transfer Training PT LTG   Transfer Training PT LTG, Date Established 08/05/17   Transfer Training PT LTG, Time to Achieve 1 wk   Transfer Training PT LTG, Activity Type sit to stand/stand to sit;bed to chair /chair to bed   Transfer Training PT LTG, [place] Level contact guard assist   Transfer Training PT LTG, Assist Device walker, rolling       Goal: Gait Training Goal LTG- PT    08/05/17 1513   Gait Training PT LTG   Gait Training Goal PT LTG, Date Established 08/05/17   Gait Training Goal PT LTG, Time to Achieve 1 wk   Gait Training Goal PT LTG, [place] Level minimum assist (75% patient effort);2 person assist required   Gait Training Goal PT LTG, Assist Device walker, rolling   Gait Training Goal PT LTG, Distance to Achieve 50

## 2017-08-06 PROBLEM — F03.918 DEMENTIA WITH BEHAVIORAL DISTURBANCE (HCC): Status: ACTIVE | Noted: 2017-01-01

## 2017-08-06 PROBLEM — A41.9 SEPSIS (HCC): Status: RESOLVED | Noted: 2017-01-01 | Resolved: 2017-01-01

## 2017-08-06 NOTE — PLAN OF CARE
Problem: Patient Care Overview (Adult)  Goal: Plan of Care Review  Outcome: Ongoing (interventions implemented as appropriate)    08/06/17 0515   Coping/Psychosocial Response Interventions   Plan Of Care Reviewed With patient   Patient Care Overview   Progress progress towards functional goals is fair   Outcome Evaluation   Outcome Summary/Follow up Plan Patient very confused during night. Very aggressive, using foul language, and refusing medication. PO haldol change to IV haldol with some relief. Daughter at bedside. VSS. Will continue to follow.

## 2017-08-06 NOTE — DISCHARGE SUMMARY
NAME: Joselin Aleman ADMIT: 8/3/2017   : 1933  PCP: Catarina Strange MD    MRN: [id number] LOS: 3 days   AGE/SEX: 84 y.o. male  ROOM: [facility]     Date of Admission:  8/3/2017  Date of Discharge:  2017    PCP: Catarina Strange MD    CHIEF COMPLAINT  Shortness of Breath      DISCHARGE DIAGNOSIS  Active Hospital Problems (** Indicates Principal Problem)    Diagnosis Date Noted   • **Dementia [F03.90] 2017   • DNR (do not resuscitate) [Z66] 2017   • Elevated troponin [R79.89] 2017   • COPD (chronic obstructive pulmonary disease) [J44.9] 2017   • Acute UTI [N39.0] 2017      Resolved Hospital Problems    Diagnosis Date Noted Date Resolved   • Bacteremia [R78.81] 2017   • Sepsis [A41.9] 2017   • Chest pain [R07.9] 2017   • Abdominal pain [R10.9] 2017       SECONDARY DIAGNOSES  Past Medical History:   Diagnosis Date   • COPD (chronic obstructive pulmonary disease)    • Dementia        CONSULTS   Dr. Holly- Psychiatry    HOSPITAL COURSE  Patient is a 84 y.o. male with dementia and COPD. He was admitted to a psychiatry facility for behavioral disturbance but had a fever to 102.3 so was brought to [facility] [place], operated by [facility]. He had a few complaints but difficult to pin down from his dementia- some mild epigastric abd pain, some chest pain.    He was admitted. Started on ceftriaxone for presumed UTI based on U/A. Vanc was added when blood cultures +, however I have now stopped this as culture was c/w contaminant. Repeat Cultures negative. Now will change to po doxy based on urine cultures x 7 more days.    There was some reported chest pain, though really seemed more to be vague abdominal pain. He did have indeterminent troponin. EKG normal. ECHO ok. Will start him on ASA 81mg daily. More aggressive workup not indicated with his minimal complaints as well as significant dementia.    He was seen by Psychiatry. We  felt he would be a good candidate to go to the [facility] to work on his dementia/behaviors/medications. He will be stablized there and should work with PT as well. Eventually then discharge to new rehab facility.       DIAGNOSTICS      Blood Culture No growth at 24 hours       Urine Culture [[id number]] (Abnormal) Collected: 08/03/17 1738       Lab Status: Final result Specimen: Urine from Urine, Catheter Updated: 08/06/17 0707        Urine Culture >100,000 CFU/mL Aerococcus viridans (A)         Aerococcus viridans is usually Susceptible to Trimethoprim sulfamethoxazole, Vancomycin, Gentamicin, and Tetracycline and Resistant to Penicillin.          Basic Metabolic Panel [[id number]] (Abnormal) Collected: 08/06/17 0634        Lab Status: Final result Specimen: Blood Updated: 08/06/17 0727        Glucose 105 (H) mg/dL         BUN 18 mg/dL         Creatinine 0.97 mg/dL         Sodium 138 mmol/L         Potassium 4.1 mmol/L         Chloride 103 mmol/L         CO2 19.4 (L) mmol/L         Calcium 8.4 (L) mg/dL         eGFR Non African Amer 74 mL/min/1.73         BUN/Creatinine Ratio 18.6        Anion Gap 15.6 mmol/L        Narrative:        CBC (No Diff) [[id number]] (Abnormal) Collected: 08/05/17 0933        Lab Status: Final result Specimen: Blood Updated: 08/05/17 1007        WBC 7.72 10*3/mm3         RBC 3.47 (L) 10*6/mm3         Hemoglobin 10.8 (L) g/dL         Hematocrit 33.1 (L) %         MCV 95.4 fL         MCH 31.1 pg         MCHC 32.6 g/dL         RDW 15.2 (H) %         RDW-SD 53.3 fl         MPV 10.2 fL         Platelets 234 10*3/mm3       XR Chest 1 View [[id number]] Not Reviewed        Order Status: Completed Collected: 08/03/17 1748        Updated: 08/04/17 1302       Narrative:         PORTABLE CHEST     HISTORY: Fever.     FINDINGS: The patient is rotated to the right. There is likely  mild-to-moderate cardiomegaly. Bibasilar atelectasis/infiltrate is  noted. There is a suggestion of pleural fluid on the right.  There is  mild prominence of the pulmonary interstitium and perihilar regions  bilaterally, exaggerated by patient positioning and rotation.             PHYSICAL EXAM  Objective     Sleepy  Elderly  nad  RRR  Lungs clear no resp distress    CONDITION ON DISCHARGE  Stable.      DISCHARGE DISPOSITION   Psychiatric Hospital or Unit (DC - External)      DISCHARGE MEDICATIONS   Devan Alemannicci FOSTER   Home Medication Instructions MING:[id number]    Printed on:08/06/17 3808   Medication Information                      acetaminophen (TYLENOL) 325 MG tablet  Take 2 tablets by mouth Every 4 (Four) Hours As Needed for Mild Pain (1-3).             aspirin 81 MG EC tablet  Take 1 tablet by mouth Daily.             CloNIDine (CATAPRES) 0.1 MG tablet  Take 0.1 mg by mouth Every 4 (Four) Hours As Needed for High Blood Pressure (blood pressure >150/90).             cyclobenzaprine (FLEXERIL) 5 MG tablet  Take 5 mg by mouth Daily.             divalproex (DEPAKOTE) 250 MG DR tablet  Take 250 mg by mouth 4 (Four) Times a Day.             doxycycline (MONODOX) 100 MG capsule  Take 1 capsule by mouth Every 12 (Twelve) Hours for 7 days. Indications: Urinary Tract Infection             felodipine (PLENDIL) 10 MG 24 hr tablet  Take 10 mg by mouth 2 (Two) Times a Day.             fluticasone (FLONASE) 50 MCG/ACT nasal spray  1 spray into each nostril Daily.             fluticasone-salmeterol (ADVAIR) 500-50 MCG/DOSE DISKUS  Inhale 1 puff 2 (Two) Times a Day.             guaiFENesin (MUCINEX) 600 MG 12 hr tablet  Take 600 mg by mouth 2 (Two) Times a Day.             haloperidol (HALDOL) 1 MG tablet  Take 3 mg by mouth Every Night.             haloperidol (HALDOL) 2 MG tablet  Inject 2 mg into the shoulder, thigh, or buttocks 4 (Four) Times a Day As Needed for Agitation.             hydrALAZINE (APRESOLINE) 25 MG tablet  Take 75 mg by mouth 3 (Three) Times a Day.             ipratropium-albuterol (DUO-NEB) 0.5-2.5 mg/mL nebulizer  Take 3 mL by  nebulization 4 (Four) Times a Day As Needed for Wheezing.             melatonin 5 MG tablet tablet  Take 6 mg by mouth Every Night. Takes 6mg             memantine (NAMENDA) 5 MG tablet  Take 5 mg by mouth Every Night.             mirtazapine (REMERON) 7.5 MG half tablet  Take 7.5 mg by mouth Every Night.             montelukast (SINGULAIR) 10 MG tablet  Take 10 mg by mouth Every Night.             polyethylene glycol (MIRALAX) packet  Take 17 g by mouth Daily.             potassium chloride (MICRO-K) 10 MEQ CR capsule  Take 10 mEq by mouth Daily.             QUEtiapine (SEROquel) 25 MG tablet  Take 50 mg by mouth Every Night.             QUEtiapine (SEROquel) 25 MG tablet  Take 25 mg by mouth Daily. Daily at 1600             sennosides-docusate sodium (SENOKOT-S) 8.6-50 MG tablet  Take 2 tablets by mouth Every Night.             vitamin B-12 (CYANOCOBALAMIN) 1000 MCG tablet  Take 1,000 mcg by mouth Daily.                No future appointments.  Additional Instructions for the Follow-ups that You Need to Schedule     Discharge Follow-up with PCP    As directed    Follow Up Details:  2 weeks             Follow-up Information     Follow up with Catarina Strange MD .    Specialty:  Family Medicine    Why:  2 weeks    Contact information:    [facility] [id number] [phone]            TEST  RESULTS PENDING AT DISCHARGE   Order Current Status    Blood Culture Preliminary result    Blood Culture Preliminary result    Blood Culture Preliminary result             Juan Diego Kendall MD  [facility]  08/06/17  2:37 PM      Time: greater than 32 minutes on discharge  It was a pleasure taking care of this patient while in the hospital.

## 2017-08-06 NOTE — NURSING NOTE
Skin assessment completed upon pt's arrival. Reddened scab on R hip, previously charted as pressure ulcer, but per notes wound nurse assessed and stated was not. Large bruise extending from L hip to buttocks and lower back. Scattered bruising on R lower arm and wrist. Band-aid on right arm where it looks like blood was drawn, as well as 2 band-aids on L arm for blood draw/IV insertion. Red, moist skin tear on L elbow covered by dressing.

## 2017-08-06 NOTE — PLAN OF CARE
Problem: Patient Care Overview (Adult)  Goal: Plan of Care Review  Outcome: Outcome(s) achieved Date Met:  08/06/17 08/06/17 1445   Coping/Psychosocial Response Interventions   Plan Of Care Reviewed With patient   Patient Care Overview   Progress improving   Outcome Evaluation   Outcome Summary/Follow up Plan VSS. Discharged to [facility] per MD order. Family at bedside.

## 2017-08-07 PROBLEM — I10 HTN (HYPERTENSION): Status: ACTIVE | Noted: 2017-01-01

## 2017-08-07 NOTE — PLAN OF CARE
Problem:  Patient Care Overview (Adult)  Goal: Plan of Care Review  Outcome: Ongoing (interventions implemented as appropriate)    08/07/17 1532   Patient Care Overview   Progress no change   Outcome Evaluation   Outcome Summary/Follow up Plan Pt transferrred to [facility]. Spoke with pt's daughter Rosetta (POA) [phone] who stated that they would like for pt to be able [facility]. [phone] intake [facility] who [facility] but would [facility] pt ifa bed does become available. Also discussed with pt's daughter Rosetta [facility]. Pt's suleman stated that they are intersted in care that keeps pt comfortable Rosetta stated that she feels pt has been on a decline and that he could be at a new baseline. SW also called pt's son Ravindra [phone] who also confirmed that he feels that pt may need [facility] for pallaitive care. Informed both Navid and Ravindra that SW will be following with the tx team to assist with any referrals needed assist pt with after care needs.   Coping/Psychosocial Response Interventions   Plan Of Care Reviewed With family   Coping/Psychosocial   Patient Agreement with Plan of Care agrees       Goal: Discharge Needs Assessment  Outcome: Ongoing (interventions implemented as appropriate)

## 2017-08-07 NOTE — PROGRESS NOTES
LOS: 1 day     Name: Joselin Aleman  Age/Sex: 84 y.o. male  :  1933        PCP: Catarina Strange MD    Subjective   Feels ok today denies any new complaints ROS reviewed from admission.    General: No Fever or Chills, Cardiac: No Chest Pain or Palpitations, Resp: No Cough or SOA, GI: No Nausea, Vomiting, or Diarrhea and Other: No bleeding      [START ON 2017] aspirin 81 mg Oral Daily   budesonide-formoterol 2 puff Inhalation BID - RT   cyclobenzaprine 5 mg Oral Daily   Divalproex Sodium 250 mg Oral TID   doxycycline 100 mg Oral Q12H   felodipine 10 mg Oral Nightly   fluticasone 1 spray Each Nare Daily   guaiFENesin 600 mg Oral BID   haloperidol 3 mg Oral Nightly   hydrALAZINE 50 mg Oral Q8H   hydrALAZINE 75 mg Oral Q8H   melatonin 5 mg Oral Nightly   memantine 5 mg Oral Nightly   mirtazapine 7.5 mg Oral Nightly   montelukast 10 mg Oral Nightly   polyethylene glycol 17 g Oral Daily   QUEtiapine 50 mg Oral Nightly   sennosides-docusate sodium 2 tablet Oral Nightly   vitamin B-12 1,000 mcg Oral Daily          Objective   Vital Signs  Temp:  [97.5 °F (36.4 °C)-98.8 °F (37.1 °C)] 98.8 °F (37.1 °C)  Heart Rate:  [75-90] 79  Resp:  [16] 16  BP: (125-139)/(55-89) 126/64  Body mass index is 29.78 kg/(m^2).    Intake/Output Summary (Last 24 hours) at 17 1129  Last data filed at 17 0900   Gross per 24 hour   Intake              840 ml   Output               50 ml   Net              790 ml       Physical Exam   Constitutional: He appears well-developed.   HENT:   Head: Normocephalic and atraumatic.   Mouth/Throat: No oropharyngeal exudate.   Dentures are loose fitting   Eyes: EOM are normal. No scleral icterus.   Neck: Normal range of motion. Neck supple. No JVD present.   Cardiovascular: Normal rate, regular rhythm and normal heart sounds.    Pulmonary/Chest: Effort normal and breath sounds normal.   Abdominal: Soft. Bowel sounds are normal.   Musculoskeletal: Normal range of motion.    Neurological: He is alert.   Skin: Skin is warm and dry.   Psychiatric:   Sleepy affect full   Nursing note and vitals reviewed.        Results Review:       I reviewed the patient's new clinical results.    Results from last 7 days  Lab Units 08/05/17  0933 08/04/17  0622 08/03/17  1544   WBC 10*3/mm3 7.72 9.30 14.12*   HEMOGLOBIN g/dL 10.8* 10.2* 10.4*   PLATELETS 10*3/mm3 234 230 264     Results from last 7 days  Lab Units 08/06/17  0634 08/05/17  0933 08/04/17  1235 08/03/17  1544   SODIUM mmol/L 138 136  --  138   POTASSIUM mmol/L 4.1 3.4*  --  4.1   CHLORIDE mmol/L 103 100  --  101   CO2 mmol/L 19.4* 22.1  --  22.6   BUN mg/dL 18 15  --  24*   CREATININE mg/dL 0.97 0.94  --  1.28*   CALCIUM mg/dL 8.4* 8.8  --  8.9   MAGNESIUM mg/dL  --   --  2.0  --    Estimated Creatinine Clearance: 50 mL/min (by C-G formula based on Cr of 0.97).      Assessment/Plan   Active Problems:    Acute UTI    Dementia with behavioral disturbance    HTN (hypertension)      PLAN  - Added hold parameters to BP meds and reordered hydralazine goal BP will be sys less than 160  - Continue Doxycycline for UTI x 2 more days  - defer psychiatric care to primary team      Disposition        Pasquale Dubon MD  [facility]  08/07/17  11:29 AM

## 2017-08-07 NOTE — PLAN OF CARE
Problem: BH Patient Care Overview (Adult)  Goal: Interdisciplinary Rounds/Family Conference  Outcome: Ongoing (interventions implemented as appropriate)    08/07/17 0946   Interdisciplinary Rounds/Family Conf   Summary Treatment team met to discuss plan of care. Plan for palliative consult.  to assess patient's ability to return to [facility] at a higher level of care.    Participants art therapy;nursing;pharmacy;psychiatrist;social work      Patient/Guardian Signature: __________________________________             Psychiatrist Signature: ______________________________________             Therapist Signature: ________________________________________              Nurse Signature: ___________________________________________              Staff Signature: ____________________________________________             Staff Signature: ____________________________________________              Staff Signature: ____________________________________________              Staff Signature:                                                                                                      Goal: Individualization and Mutuality  Outcome: Ongoing (interventions implemented as appropriate)    08/07/17 0946   Behavioral Health Screens   Patient Personal Strengths family/social support;stable living environment         Problem: Alteration in Thoughts and Perception  Goal: Treatment Goal: Gain control of psychotic behaviors/thinking, reduce/eliminate presenting symptoms and demonstrate improved reality functioning upon discharge  Outcome: Ongoing (interventions implemented as appropriate)  Goal: Verbalize thoughts and feelings associated with:  Outcome: Ongoing (interventions implemented as appropriate)  Goal: Refrain from acting on delusional thinking/internal stimuli  Outcome: Ongoing (interventions implemented as appropriate)  Goal: Agree to be compliant with medication regime, as prescribed and report medication side  effects  Outcome: Ongoing (interventions implemented as appropriate)  Goal: Attend and participate in unit activities, including therapeutic, recreational, and educational groups  Outcome: Ongoing (interventions implemented as appropriate)  Goal: Recognize dysfunctional thoughts, communicate reality-based thoughts at the time of discharge  Outcome: Ongoing (interventions implemented as appropriate)  Goal: Complete daily ADLs, including personal hygiene independently, as able  Outcome: Ongoing (interventions implemented as appropriate)

## 2017-08-07 NOTE — PLAN OF CARE
Problem:  Patient Care Overview (Adult)  Goal: Plan of Care Review  Outcome: Ongoing (interventions implemented as appropriate)    08/07/17 1707   Patient Care Overview   Progress no change   Outcome Evaluation   Outcome Summary/Follow up Plan Pt oriented to self only. Highly confused and agitated throughout most of shift. Unable to assess anx/dep due to confusion and mind wandering. Per assessment AM was compliant with medication, taking them crushed in applesauce. Pt became restless and agitated in afternoon and has remained so throughout duration of shift. Believes that he is late for a meeting and wants a suit jacket. Believes we will steal his shoes and his wallet. Wants to get in a taxi and leave. Pt currently sleeping. Will continue to monitor and support.   Coping/Psychosocial Response Interventions   Plan Of Care Reviewed With patient   Coping/Psychosocial   Patient Agreement with Plan of Care unable to participate       Goal: Interdisciplinary Rounds/Family Conference  Outcome: Ongoing (interventions implemented as appropriate)  Goal: Individualization and Mutuality  Outcome: Ongoing (interventions implemented as appropriate)  Goal: Discharge Needs Assessment  Outcome: Ongoing (interventions implemented as appropriate)    Problem:  Overarching Goals  Goal: Adheres to Safety Considerations for Self and Others  Outcome: Ongoing (interventions implemented as appropriate)  Intervention: Develop/maintain Individualized Safety Plan    08/07/17 0850 08/07/17 1600   Safety   Safety Measures --  safety rounds completed   Mental Health Homicide Risk   Homicidal Ideation no --    Willingness to Contact Staff Member if Feeling Like Hurting Others yes --    Provide Emotional/Physical Safety   Suicidal Ideation no --          Goal: Optimized Coping Skills in Response to Life Stressors  Outcome: Ongoing (interventions implemented as appropriate)  Intervention: Promote Effective Coping Strategies    08/07/17 0850    Coping Strategies   Supportive Measures active listening utilized;verbalization of feelings encouraged;self-care encouraged         Goal: Develops/Participates in Therapeutic [place] to Support Successful Transition  Outcome: Ongoing (interventions implemented as appropriate)  Intervention: Foster Therapeutic [place]    08/07/17 0850   Coping Strategies   Trust Relationship/Rapport care explained;choices provided;emotional support provided;empathic listening provided;questions answered;questions encouraged;reassurance provided;thoughts/feelings acknowledged       Intervention: Mutually Develop Transition Plan    08/07/17 0850   OTHER   Transition Support crisis management plan promoted           Problem: Alteration in Thoughts and Perception  Goal: Treatment Goal: Gain control of psychotic behaviors/thinking, reduce/eliminate presenting symptoms and demonstrate improved reality functioning upon discharge  Outcome: Ongoing (interventions implemented as appropriate)  Goal: Verbalize thoughts and feelings associated with:  Outcome: Ongoing (interventions implemented as appropriate)  Goal: Refrain from acting on delusional thinking/internal stimuli  Outcome: Ongoing (interventions implemented as appropriate)  Goal: Agree to be compliant with medication regime, as prescribed and report medication side effects  Outcome: Ongoing (interventions implemented as appropriate)  Goal: Attend and participate in unit activities, including therapeutic, recreational, and educational groups  Outcome: Ongoing (interventions implemented as appropriate)  Goal: Recognize dysfunctional thoughts, communicate reality-based thoughts at the time of discharge  Outcome: Ongoing (interventions implemented as appropriate)  Goal: Complete daily ADLs, including personal hygiene independently, as able  Outcome: Ongoing (interventions implemented as appropriate)    Problem: Thought Process Alteration (Adult)  Intervention: Optimize Communication     08/07/17 0850   Cognitive Interventions   Communication Enhancement Strategies call light answered in person;communication board used;verbal communication attempts encouraged;extra time allowed for response       Intervention: Provide Frequent Orientation/Reorientation    08/07/17 0850   Cognitive Interventions   Orientation Measures calendar in view   Sensory Stimulation Regulation quiet environment promoted;music/television provided for relaxation         Goal: Identify Related Risk Factors and Signs and Symptoms  Outcome: Ongoing (interventions implemented as appropriate)  Goal: Improved Thought Process  Outcome: Ongoing (interventions implemented as appropriate)    Problem: Fall Risk (Adult)  Goal: Identify Related Risk Factors and Signs and Symptoms  Outcome: Ongoing (interventions implemented as appropriate)    08/07/17 1707   Fall Risk   Fall Risk: Related Risk Factors confusion/agitation;gait/mobility problems;history of falls       Goal: Absence of Falls  Outcome: Outcome(s) achieved Date Met:  08/07/17 08/07/17 1707   Fall Risk (Adult)   Absence of Falls achieves outcome  (No falls this shift)

## 2017-08-07 NOTE — CONSULTS
Per  guidance, RN called palliative care nurse to discuss the wish of pt's son and daughter to move forward with a discussion about the possibility of palliative care for pt. Palliative care RN will be in contact with pt's daughter Rosetta.

## 2017-08-07 NOTE — PROGRESS NOTES
"Adult Nutrition  Assessment/PES    Patient Name:  Joselin Aleman  YOB: 1933  MRN: [id number]  Admit Date:  8/6/2017    Assessment Date:  8/7/2017        Reason for Assessment       08/07/17 0945    Reason for Assessment    Reason For Assessment/Visit skin risk    Infectious Disease UTI    Neurological Dementia    Pulmonary/Critical Care COPD    Skin Pressure ulcer              Nutrition/Diet History       08/07/17 0950    Nutrition/Diet History    Other transfered from acute care after adm from NH for uti/sepsis - to work on dementia/behaviors/meds.             Anthropometrics       08/07/17 0946    Anthropometrics    RD Documented Weight on Admission 73.5 kg (162 lb)    Anthropometrics (Special Considerations)    Height Used for Calculations 1.575 m (5' 2\")    Body Mass Index (BMI)    BMI Grade 25 - 29.9 - overweight            Labs/Tests/Procedures/Meds       08/07/17 0946    Labs/Tests/Procedures/Meds    Diagnostic Test/Procedure Review reviewed    Labs/Tests Review Reviewed;Glucose    Medication Review Reviewed, pertinent    Significant Vitals reviewed            Physical Findings       08/07/17 0947    Physical Appearance    Skin pressure ulcer(s)   R hip has red scab, bruising. Wound nurse said it is not a pressure sore.               Nutrition Prescription Ordered       08/07/17 0948    Nutrition Prescription PO    Current PO Diet Regular    Supplement Ensure Enlive    Supplement Frequency 3 times a day            Evaluation of Received Nutrient/Fluid Intake       08/07/17 0949    PO Evaluation    Number of Days PO Intake Evaluated 2 days    Number of Meals 5    % PO Intake 45% avg              Problem/Interventions:        Problem 1       08/07/17 0950    Nutrition Diagnoses Problem 1    Problem 1 Predicted Suboptimal Intake    Etiology (related to) Factors Affecting Nutrition    Neurological Dementia    Signs/Symptoms (evidenced by) Report/Observation    Mental State/Condition " Confusion;Other (comment)   hallucinations reported                    Intervention Goal       08/07/17 0950    Intervention Goal    General Maintain nutrition;Disease management/therapy    PO Increase intake;PO intake (%)    PO Intake % 75 %    Weight No significant weight loss            Nutrition Intervention       08/07/17 0952    Nutrition Intervention    RD/Tech Action Encourage intake;Follow Tx progress;Care plan reviewd              Education/Evaluation       08/07/17 0952    Monitor/Evaluation    Monitor Per protocol        Comments:  No recommendations at this time. Ensure supplements started by RD on acute - will continue with these for kcal/pro support. Following.     Electronically signed by:  Bettye Barker RD  08/07/17 9:52 AM

## 2017-08-07 NOTE — NURSING NOTE
Pt was actively trying to get out of bed. Staff went to help pt get up from bed and go to toilet per his request. Pt became irritable and verbally abusive with staff during this time. Staff helped pt toilet and got pt up to chair for lunch. Pt refused lunch and at 1230 became restless and verbally agitated, quickly trying to get out of chair. Pt is very unsteady on feet. Pt began cursing and attempting to hit staff. Descalation techniques were attempted and ineffective. RN called Dr. Holly and received an order for Zydis 10mg PO once. Pt was compliant with taking medication in chocolate ice cream. Pt remains restless. Pt is convinced he needs a suit jacket for a meeting at [facility]. Pt continues to demand a haircut and states he wants to leave. Continuously believes staff will steal his belongings. Reorientation attempts unsuccessful. Pt remains agitated, hostile, and demanding with staff despite PRN medication given. Will continue to monitor pt.

## 2017-08-07 NOTE — H&P
IDENTIFYING INFORMATION: The patient is an 84-year-old white male admitted in transfer from the main hospital with behavioral disturbance related to history of dementia.      CHIEF COMPLAINT:  None given    INFORMANT:  Chart    RELIABILITY:  Good    HISTORY OF PRESENT ILLNESS: The patient is an 84-year-old white male diagnosed with dementia in 2010.  He is admitted to the [facility] after having become violent at home.  For more complete history of present illness please refer to previous dictated consultation note.    PAST PSYCHIATRIC HISTORY: Reviewed, no changes    PAST MEDICAL HISTORY:  Reviewed, no changes    MEDICATIONS: Aspirin Symbicort Rocephin Flexeril Depakote Lovenox Pepcid Plendil Flonase Mucinex Apresoline melatonin Namenda Remeron Singulair Micro-K Seroquel Senokot    ALLERGIES:  Prednisone    FAMILY HISTORY:  Reviewed no changes    SOCIAL HISTORY: Reviewed no changes    MENTAL STATUS EXAM: The patient is an elderly white male appearing his stated age.  He is seen in the day room sitting in a Emma chair.  The patient is awake and alert and oriented to person only speech is impoverished and tangential.  The patient cannot comply with formal testing of memory or cognition.  His mood is calm his affect blunted.  He reports no suicidal or homicidal ideation and does not appear to be responding to internal stimuli.  His judgment and insight appear to be egregious Alfredo impaired.    ASSETS/LIABILITIES: Supportive family/failing health    DIAGNOSTIC IMPRESSION: Primary dementia Alzheimer's type with behavioral disturbance, benign prostatic hypertrophy, COPD, GERD, environmental allergies    PLAN:  We will increase the patient's Depakote to 3 times daily dosing and consider upper titration of Seroquel to address the patient's behavioral issues.  We will need to discuss with family what their plans are regarding disposition.  PT and OT will be ordered, I do not feel as though neuropsychological evaluation would be of  much benefit at this point given the patient's advanced state of dementia.  Estimated length of stay in the hospital is 10-14 days.

## 2017-08-07 NOTE — PLAN OF CARE
Problem:  Patient Care Overview (Adult)  Goal: Plan of Care Review  Outcome: Ongoing (interventions implemented as appropriate)    08/07/17 0000 08/07/17 0554   Patient Care Overview   Progress --  no change   Outcome Evaluation   Outcome Summary/Follow up Plan --  Unable to assess anxiety, depression, SI/HI, and hallucinations r/t confusion. A & O to self only. Cooperative and med compliant. Continue to monitor and assess.    Coping/Psychosocial   Patient Agreement with Plan of Care unable to participate --    Coping/Psychosocial Response Interventions   Plan Of Care Reviewed With patient --        Goal: Interdisciplinary Rounds/Family Conference  Outcome: Ongoing (interventions implemented as appropriate)  Goal: Individualization and Mutuality  Outcome: Ongoing (interventions implemented as appropriate)  Goal: Discharge Needs Assessment  Outcome: Ongoing (interventions implemented as appropriate)    Problem:  Overarching Goals  Goal: Adheres to Safety Considerations for Self and Others  Outcome: Ongoing (interventions implemented as appropriate)  Intervention: Develop/maintain Individualized Safety Plan    08/07/17 0000   Safety   Safety Measures safety rounds completed;suicide assessment completed   Mental Health Homicide Risk   Homicidal Ideation no   Provide Emotional/Physical Safety   Suicidal Ideation no         Goal: Optimized Coping Skills in Response to Life Stressors  Outcome: Ongoing (interventions implemented as appropriate)  Intervention: Promote Effective Coping Strategies    08/07/17 0000   Coping Strategies   Supportive Measures active listening utilized;positive reinforcement provided;self-care encouraged;self-reflection promoted;self-responsibility promoted;verbalization of feelings encouraged         Goal: Develops/Participates in Therapeutic [place] to Support Successful Transition  Outcome: Ongoing (interventions implemented as appropriate)  Intervention: Foster Therapeutic [place]     08/07/17 0000   Coping Strategies   Trust Relationship/Rapport care explained;choices provided;emotional support provided;empathic listening provided;questions answered;questions encouraged;reassurance provided;thoughts/feelings acknowledged       Intervention: Mutually Develop Transition Plan    08/07/17 0000   OTHER   Transition Support crisis management plan promoted

## 2017-08-08 NOTE — PLAN OF CARE
Problem:  Patient Care Overview (Adult)  Goal: Plan of Care Review  Outcome: Ongoing (interventions implemented as appropriate)    08/08/17 1527   Patient Care Overview   Progress improving   Outcome Evaluation   Outcome Summary/Follow up Plan PT CONTINUE TO BE CONFUSED. NO COMBATIVE BX'S NOTED THIS SHIFT. PT COOPERATIVE WITH HANDS ON CARE. BILLY VEST RESTRAINT DISCONTINUED AT 1145 THIS MORNING. NO FURTHER ISSUES OF CAMBATIVENESS NOTED. PT CONTINUES TO ONLY BE ONLY ALERT TO SELF WITH REORIENTATION TO PLACE, TIME AND SITUATION. A PALLIATIVE REPRESENTATIVE MET WITH THE PT FAMILY AND  TODAY. PLEASE SEE SW NOTES. REPORTED NO PAIN. PT IN BED THIS SHIFT. NO FALLS NOTED AT THIS TIME. ASSISTANCE X2 NEEDED FOR ALL ADL'S. WILL CONTINUE TO MONITOR PT AND NOTE ANY CHANGES.   Coping/Psychosocial Response Interventions   Plan Of Care Reviewed With patient   Coping/Psychosocial   Patient Agreement with Plan of Care agrees         Problem: Thought Process Alteration (Adult)  Goal: Identify Related Risk Factors and Signs and Symptoms  Outcome: Ongoing (interventions implemented as appropriate)  Goal: Improved Thought Process  Outcome: Ongoing (interventions implemented as appropriate)    Problem: Fall Risk (Adult)  Goal: Identify Related Risk Factors and Signs and Symptoms  Outcome: Ongoing (interventions implemented as appropriate)    Problem: SAFETY - NON-VIOLENT RESTRAINT  Goal: Remains free of injury from restraints (Non-Violent Restraint)  Outcome: Outcome(s) achieved Date Met:  08/08/17  RESTRAINT DISCONTINUED AT 1145  Goal: Free from restraint(s) (Non-Violent Restraint)  Outcome: Outcome(s) achieved Date Met:  08/08/17  RESTRAINT DISCONTINUED AT 1145

## 2017-08-08 NOTE — PLAN OF CARE
Problem:  Patient Care Overview (Adult)  Goal: Plan of Care Review  Outcome: Ongoing (interventions implemented as appropriate)    08/07/17 2009 08/08/17 0308   Outcome Evaluation   Outcome Summary/Follow up Plan --  Pt remains confused, oriented to self only, c/o pain often, tylenol given with little relief, pt very restless, would not stay seated in chair or in bed, pt became agitated, aggressive and verbally abusive to staff, prn IM was given with no relief, called the doctor got and order for a posey and geodon, the geodon im was effective, as was the posey vest. Later pt became agitated and aggressive during a brief change, pt was redirected, will continue to monitor changes in mood and behaviors, provide feedback and support.   Coping/Psychosocial Response Interventions   Plan Of Care Reviewed With patient --    Coping/Psychosocial   Patient Agreement with Plan of Care unable to participate --          Problem:  Overarching Goals  Goal: Adheres to Safety Considerations for Self and Others  Intervention: Develop/maintain Individualized Safety Plan    08/07/17 2009 08/08/17 0308   Provide Emotional/Physical Safety   Suicidal Ideation no --    Willingness to Contact Staff Member if Feeling Like Hurting Self --  yes   Mental Health Homicide Risk   Homicidal Ideation no --    Willingness to Contact Staff Member if Feeling Like Hurting Others --  yes   Safety   Safety Measures safety rounds completed;suicide assessment completed --          Goal: Optimized Coping Skills in Response to Life Stressors  Intervention: Promote Effective Coping Strategies    08/07/17 2009   Coping Strategies   Supportive Measures active listening utilized;verbalization of feelings encouraged;relaxation techniques promoted;decision-making supported;goal setting facilitated         Goal: Develops/Participates in Therapeutic [place] to Support Successful Transition  Intervention: Foster Therapeutic [place]    08/07/17 2009   Coping  Strategies   Trust Relationship/Rapport choices provided;care explained;emotional support provided;empathic listening provided;questions answered;questions encouraged;reassurance provided;thoughts/feelings acknowledged       Intervention: Mutually Develop Transition Plan    08/07/17 2009   OTHER   Transition Support crisis management plan promoted           Problem: SAFETY - NON-VIOLENT RESTRAINT  Goal: Remains free of injury from restraints (Non-Violent Restraint)  Outcome: Ongoing (interventions implemented as appropriate)  Pt continues to remain free from injury while in non-violent posey restraint. Will continue to monitor and assess.  Goal: Free from restraint(s) (Non-Violent Restraint)  Outcome: Ongoing (interventions implemented as appropriate)  Pt continues to meet criteria to remain in a posey vest (non-violent) restraint. Will continue to assess the need.

## 2017-08-08 NOTE — CONSULTS
Purpose of the visit was to evaluate for: goals of care/advanced care planning and hospice referral/discussion. Spoke with RN as well as family and HCS and discussed palliative care, goals of care, care options, [facility] and discharge options.      Assessment:  Patient is palliative care appropriate for community based services SNF with palliative care due to advanced dementia, alzheimers, COPD. He is a DNR, has been at SNF for personal care [facility], but will need to have skilled care ongoing. Gave information about what Palliative Care is, comfort care, and that they can ask for that at the SNF when he returns. Discussed aggressive measures and what would be considered prolonged suffering to him if he were able to speak for himself. His daughter and son both agree that they want him to be comfortable and not to suffer. They would like to talk to [facility] before he goes to SNF so that they know when to call them.     Recommendations/Plan: [facility] evaluation for community based services. Talked with Beverly Wallace about having [facility] talk to family before he leaves the hospital.     Other Comments: Thank you for the referral.     Total time: 30 minutes.

## 2017-08-08 NOTE — PROGRESS NOTES
The patient is now in a Posey.  He is pleasantly confused with this physician today, but had been violent towards staff requiring multiple when necessary's last evening.  We begin a trial of Depakote sprinkles, and we'll now add Zyprexa both scheduled and as needed.  We will not use Geodon again given the patient's extended QTc interval

## 2017-08-08 NOTE — PROGRESS NOTES
Blood pressure reviewed. Chart reviewed.  He remained stable and his blood pressures within normal limits.  We'll defer further care to the psychiatric team.  We'll sign off for now please call with any questions or concerns.  Pasquale Dubon MD  2:04 PM

## 2017-08-09 NOTE — PROGRESS NOTES
Continued Stay Note  [facility]     Patient Name: Joselin Aleman  MRN: [id number]  Today's Date: 8/9/2017    Admit Date: 8/6/2017          Discharge Plan       08/09/17 1557    Case Management/Social Work Plan    Additional Comments Spoke with Kassandra with [facility] who met pt's  daughter and son today to discuss [facility] services.   Per Kassandra she will plan to follow up with pt's progress on Monday 8/14 for on going assessment for [facility].                Discharge Codes     None            RASHEL Razo

## 2017-08-09 NOTE — PLAN OF CARE
Problem:  Patient Care Overview (Adult)  Goal: Plan of Care Review  Outcome: Ongoing (interventions implemented as appropriate)    08/09/17 0337   Outcome Evaluation   Outcome Summary/Follow up Plan Pt was calm and relatively agreeable early in the shift. He was oriented to person only, but could resond to some basic assessment questions. He was compliant with scheduled meds and declined when snacks were offered. Later in the shift (starting around 0200) pt's confusion and agitation increased somewhat . He intermittently attempted to get out of bed and was hostile when staff responded to bed alarm PT very unsteady with shuffled gait, weakness, and apparent pain from semi-recent fall. The pt has not yet showed any physical aggression, but mood, when in contact with staff, has changed from calm to more agitated and cursing at times. He has had two incidents of urinary incontinence. PRN Tylenol given earlier in shift. No PRNs for agitation given as of time of this writing, because pt's agitation and confusion have been somewhat redirectable. Will continue to monitor and suppor.t    Coping/Psychosocial Response Interventions   Plan Of Care Reviewed With patient       Goal: Individualization and Mutuality  Outcome: Ongoing (interventions implemented as appropriate)    08/09/17 0337   Behavioral Health Screens   Patient Personal Strengths family/social support   Patient Personal Strengths Comment Family support- daughter= POA   Patient Vulnerabilities [place] risk    Individualization   Patient Specific Interventions Comfort care measures          Problem:  Overarching Goals  Goal: Adheres to Safety Considerations for Self and Others  Intervention: Develop/maintain Individualized Safety Plan    08/09/17 0337   Provide Emotional/Physical Safety   Suicidal Ideation no   Mental Health Homicide Risk   Homicidal Ideation no   Safety   Safety Measures safety rounds completed         Goal: Optimized Coping Skills in  Response to Life Stressors  Intervention: Promote Effective Coping Strategies    08/09/17 0337   Coping Strategies   Supportive Measures positive reinforcement provided;verbalization of feelings encouraged

## 2017-08-09 NOTE — PLAN OF CARE
Problem:  Patient Care Overview (Adult)  Goal: Individualization and Mutuality    08/09/17 0350   Mutuality/Individual Preferences   What Information Would Help Us Give You More Personalized Care? 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

## 2017-08-09 NOTE — PROGRESS NOTES
The results of the palliative consult are reviewed and deeply appreciated.  The patient is now out of his Posey, and his behavior has improved somewhat.  He was a bit agitated this morning but was redirected.  He is compliant with all medications.    We will go forward with the hospital course consultation is recommended by palliative

## 2017-08-10 NOTE — PLAN OF CARE
Problem:  Patient Care Overview (Adult)  Goal: Plan of Care Review  Outcome: Ongoing (interventions implemented as appropriate)    08/10/17 0332   Patient Care Overview   Progress no change   Outcome Evaluation   Outcome Summary/Follow up Plan Pt has remained restless & confused with expressive aphasia and periods of agitation. He had one incident of becoming agitated with an MHT while assisting with a urinal, in which he yelled and pushed urinal and staff's hand aside aggressively. He has been medicated for pain and insomnia this shift. He shows signs of increased pain duing position changes or sitting on toilet (pt had recent fall at [facility]). Pt oriented to person only. He took all scheduled meds with much encouragement. He has reached a period of what seems to be solid, comfortable sleep around 0200. Will continue to monitor and support pt.    Coping/Psychosocial Response Interventions   Plan Of Care Reviewed With patient   Coping/Psychosocial   Patient Agreement with Plan of Care unable to participate       Goal: Individualization and Mutuality  Outcome: Ongoing (interventions implemented as appropriate)    08/10/17 0332   Behavioral Health Screens   Patient Personal Strengths family/social support         Problem:  Overarching Goals  Goal: Adheres to Safety Considerations for Self and Others  Intervention: Develop/maintain Individualized Safety Plan    08/10/17 0332   Provide Emotional/Physical Safety   Suicidal Ideation no   Mental Health Homicide Risk   Homicidal Ideation no   Safety   Safety Measures environmental rounds completed;safety rounds completed         08/10/17 0332   Provide Emotional/Physical Safety   Suicidal Ideation no   Mental Health Homicide Risk   Homicidal Ideation no   Safety   Safety Measures environmental rounds completed;safety rounds completed         Goal: Optimized Coping Skills in Response to Life Stressors  Intervention: Promote Effective Coping Strategies     08/10/17 0332   Coping Strategies   Supportive Measures positive reinforcement provided

## 2017-08-10 NOTE — PROGRESS NOTES
Nighttime agitation remains problematic with this patient.  He required when necessary Zyprexa early this morning.  I will increase the patient's nighttime dose of Zyprexa to 10 mg, and plan to check a Depakote level in the next day or so.

## 2017-08-10 NOTE — PLAN OF CARE
Problem:  Patient Care Overview (Adult)  Goal: Plan of Care Review  Outcome: Ongoing (interventions implemented as appropriate)    08/09/17 1954   Patient Care Overview   Progress no change   Outcome Evaluation   Outcome Summary/Follow up Plan PT orientated to self only. Restless but, not combative. Assisted with ambulation for distraction. PT con't to want to get up out of chair by self. staff one on one for safety. PT back and forth from chair to bed for short stays., confused. 1745 hrs, [id number] mg Zyprexa IM for agitation. Will con't to monitor  behavior and provide support.    Coping/Psychosocial Response Interventions   Plan Of Care Reviewed With patient   Coping/Psychosocial   Patient Agreement with Plan of Care unable to participate       Goal: Interdisciplinary Rounds/Family Conference  Outcome: Ongoing (interventions implemented as appropriate)  Goal: Individualization and Mutuality  Outcome: Ongoing (interventions implemented as appropriate)  Goal: Discharge Needs Assessment  Outcome: Ongoing (interventions implemented as appropriate)    Problem:  Overarching Goals  Goal: Adheres to Safety Considerations for Self and Others  Outcome: Ongoing (interventions implemented as appropriate)  Goal: Optimized Coping Skills in Response to Life Stressors  Outcome: Ongoing (interventions implemented as appropriate)  Goal: Develops/Participates in Therapeutic [place] to Support Successful Transition  Outcome: Ongoing (interventions implemented as appropriate)

## 2017-08-11 NOTE — PLAN OF CARE
Problem:  Patient Care Overview (Adult)  Goal: Plan of Care Review  Outcome: Ongoing (interventions implemented as appropriate)    08/10/17 1955   Patient Care Overview   Progress no change   Outcome Evaluation   Outcome Summary/Follow up Plan PT is orientated to self only. Unable to assess mental health issues. Less confused and restless this AM. Was compliant with medications. Semi continent of urine. Became increasing restless after 1400 hrs requring one on one . Admin 10 mg Zyprexa IM at 1625 hrs. PT slept for an hour. PT spat out 1800 hrs medication. PT became agitated and swung at staff member at 1845 hrs. PT was redirected.    Coping/Psychosocial Response Interventions   Plan Of Care Reviewed With patient   Coping/Psychosocial   Patient Agreement with Plan of Care unable to participate       Goal: Interdisciplinary Rounds/Family Conference  Outcome: Ongoing (interventions implemented as appropriate)  Goal: Individualization and Mutuality  Outcome: Ongoing (interventions implemented as appropriate)  Goal: Discharge Needs Assessment  Outcome: Ongoing (interventions implemented as appropriate)    Problem:  Overarching Goals  Goal: Adheres to Safety Considerations for Self and Others  Outcome: Ongoing (interventions implemented as appropriate)  Goal: Optimized Coping Skills in Response to Life Stressors  Outcome: Ongoing (interventions implemented as appropriate)  Goal: Develops/Participates in Therapeutic [place] to Support Successful Transition  Outcome: Ongoing (interventions implemented as appropriate)

## 2017-08-11 NOTE — PLAN OF CARE
Problem:  Patient Care Overview (Adult)  Goal: Plan of Care Review  Outcome: Ongoing (interventions implemented as appropriate)    08/11/17 1030 08/11/17 1720   Patient Care Overview   Progress --  improving   Coping/Psychosocial Response Interventions   Plan Of Care Reviewed With patient --    Coping/Psychosocial   Patient Agreement with Plan of Care unable to participate --          Problem:  Overarching Goals  Goal: Adheres to Safety Considerations for Self and Others  Outcome: Ongoing (interventions implemented as appropriate)  Intervention: Develop/maintain Individualized Safety Plan    08/11/17 1030 08/11/17 1400   Provide Emotional/Physical Safety   Suicidal Ideation no --    Mental Health Homicide Risk   Homicidal Ideation no --    Safety   Safety Measures --  safety rounds completed         Goal: Optimized Coping Skills in Response to Life Stressors  Outcome: Ongoing (interventions implemented as appropriate)  Intervention: Promote Effective Coping Strategies    08/11/17 1030   Coping Strategies   Supportive Measures active listening utilized         Goal: Develops/Participates in Therapeutic [place] to Support Successful Transition  Outcome: Ongoing (interventions implemented as appropriate)  Intervention: Foster Therapeutic [place]    08/11/17 1030   Coping Strategies   Trust Relationship/Rapport care explained;choices provided;emotional support provided;questions answered;reassurance provided       Intervention: Mutually Develop Transition Plan    08/11/17 1030   OTHER   Transition Support crisis management plan promoted           Problem: Fall Risk (Adult)  Intervention: Monitor/Assist with Self Care    08/11/17 0015 08/11/17 1030 08/11/17 1500   Monitor/Assist with Self Care   Ambulation --  3-->assistive equipment and person --    Transferring --  3-->assistive equipment and person --    Toileting --  3-->assistive equipment and person --    Bathing --  3-->assistive equipment and person --     Dressing --  2-->assistive person --    Eating --  1-->assistive equipment --    Communication --  2-->difficulty understanding (not related to language barrier) --    Swallowing (if score 2 or more for any item, consult Rehab Services) --  0-->swallows foods/liquids without difficulty --    Activity   Activity Type --  --  activity adjusted per tolerance   Activity Level of Assistance --  --  assistance, 2 people   Musculoskeletal Interventions   Self-Care Promotion meal setup provided --  --        Intervention: Reduce Risk/Promote Restraint Free Environment    08/11/17 1500   Safety Interventions   Safety/Security Measures bed alarm set   Restraint Interventions   Safety Promotion/Fall Prevention safety round/check completed;fall prevention program maintained;nonskid shoes/slippers when out of bed       Intervention: Review Medications/Identify Contributors to Fall Risk    08/11/17 4730   Safety Interventions   Medication Review/Management medications reviewed

## 2017-08-11 NOTE — PROGRESS NOTES
The patient awoke again at 4 AM, but was no management problem and went back to sleep.  The increased dose of Zyprexa at bedtime seems to have been helpful, but early morning awakening  remains problematic.  I will add a 5 mg dose of Zyprexa at suppertime in hopes of addressing ongoing nighttime issues.

## 2017-08-11 NOTE — PLAN OF CARE
Problem: [facility] Patient Care Overview (Adult)  Goal: Interdisciplinary Rounds/Family Conference  Outcome: Ongoing (interventions implemented as appropriate)    08/11/17 0936   Interdisciplinary Rounds/Family Conf   Summary tx team met to discuss plan of care. Pt still not stable enough for placement. due to ongoing aggressive behaviors. Plan to continue to work on med management to reduce aggressive behaviors.    Participants psychiatrist;social work;pharmacy;pastoral care;nursing;art therapy;      Patient/Guardian Signature: __________________________________             Psychiatrist Signature: ______________________________________             Therapist Signature: ________________________________________              Nurse Signature: ___________________________________________              Staff Signature: ____________________________________________             Staff Signature: ____________________________________________              Staff Signature: ____________________________________________              Staff Signature:                                                                                                    Problem: Alteration in Thoughts and Perception  Goal: Treatment Goal: Gain control of psychotic behaviors/thinking, reduce/eliminate presenting symptoms and demonstrate improved reality functioning upon discharge  Outcome: Ongoing (interventions implemented as appropriate)  Goal: Attend and participate in unit activities, including therapeutic, recreational, and educational groups  Outcome: Ongoing (interventions implemented as appropriate)  Goal: Recognize dysfunctional thoughts, communicate reality-based thoughts at the time of discharge  Outcome: Ongoing (interventions implemented as appropriate)  Goal: Complete daily ADLs, including personal hygiene independently, as able  Outcome: Ongoing (interventions implemented as appropriate)

## 2017-08-11 NOTE — PLAN OF CARE
Problem:  Patient Care Overview (Adult)  Goal: Plan of Care Review  Outcome: Ongoing (interventions implemented as appropriate)    08/11/17 0015 08/11/17 0420   Patient Care Overview   Progress --  no change   Outcome Evaluation   Outcome Summary/Follow up Plan --  Pt remains confused and oriented to self only. Pt has remained in bed for the majority of the evening, and is intermittently restless. Pt is sleeping at this time, however, he has been awakening every so often and trying to get out of bed. Pt has been redirectable thus far. Falls precautions maintained. Will continue to monitor.    Coping/Psychosocial Response Interventions   Plan Of Care Reviewed With patient --    Coping/Psychosocial   Patient Agreement with Plan of Care unable to participate --        Goal: Individualization and Mutuality    08/11/17 0420   Behavioral Health Screens   Patient Personal Strengths expressive of needs;expressive of emotions;family/social support         Problem:  Overarching Goals  Goal: Adheres to Safety Considerations for Self and Others  Outcome: Ongoing (interventions implemented as appropriate)  Intervention: Develop/maintain Individualized Safety Plan    08/11/17 0015 08/11/17 0200   Provide Emotional/Physical Safety   Suicidal Ideation no --    Willingness to Contact Staff Member if Feeling Like Hurting Self yes --    Mental Health Homicide Risk   Homicidal Ideation no --    Willingness to Contact Staff Member if Feeling Like Hurting Others yes --    Safety   Safety Measures --  safety rounds completed         Goal: Optimized Coping Skills in Response to Life Stressors  Outcome: Ongoing (interventions implemented as appropriate)  Intervention: Promote Effective Coping Strategies    08/11/17 0015   Coping Strategies   Supportive Measures active listening utilized;verbalization of feelings encouraged         Goal: Develops/Participates in Therapeutic [place] to Support Successful Transition  Outcome: Ongoing  (interventions implemented as appropriate)  Intervention: Foster Therapeutic [place]    08/11/17 0015   Coping Strategies   Trust Relationship/Rapport care explained;thoughts/feelings acknowledged       Intervention: Mutually Develop Transition Plan    08/11/17 0015   OTHER   Transition Support crisis management plan promoted           Problem: Fall Risk (Adult)  Intervention: Reduce Risk/Promote Restraint Free Environment    08/11/17 0300   Safety Interventions   Safety/Security Measures bed alarm set   Environmental Safety Modification assistive device/personal items within reach;clutter free environment maintained;lighting adjusted;room near unit station;room organization consistent   Restraint Interventions   Safety Promotion/Fall Prevention safety round/check completed;fall prevention program maintained;nonskid shoes/slippers when out of bed       Intervention: Review Medications/Identify Contributors to Fall Risk    08/08/17 1527   Safety Interventions   Medication Review/Management medications reviewed         Goal: Identify Related Risk Factors and Signs and Symptoms  Outcome: Ongoing (interventions implemented as appropriate)    08/11/17 0420   Fall Risk   Fall Risk: Related Risk Factors age-related changes;confusion/agitation;history of falls;gait/mobility problems   Fall Risk: Signs and Symptoms presence of risk factors

## 2017-08-12 NOTE — PLAN OF CARE
Problem: SAFETY - NON-VIOLENT RESTRAINT  Goal: Remains free of injury from restraints (Non-Violent Restraint)  Outcome: Ongoing (interventions implemented as appropriate)  PT remains confused presenting a safety risk to himeslf  Goal: Free from restraint(s) (Non-Violent Restraint)  Outcome: Ongoing (interventions implemented as appropriate)  PT remains confused presenting a safety risk to himeslf

## 2017-08-12 NOTE — PROGRESS NOTES
The patient continues to require a Posey restraint, and sleep remains quite problematic.  I will check a Depakote level in the morning, and we will increase the patient's nighttime Remeron dose to 15 mg.  Titration of Depakote and/or Zyprexa may need to continue.

## 2017-08-12 NOTE — PLAN OF CARE
Problem: SAFETY - NON-VIOLENT RESTRAINT  Goal: Remains free of injury from restraints (Non-Violent Restraint)  Outcome: Ongoing (interventions implemented as appropriate)  While pt remains in posey vest non-violent restraint pt has been free from any and all injuries, will continue to monitor and assess.  Goal: Free from restraint(s) (Non-Violent Restraint)  Outcome: Ongoing (interventions implemented as appropriate)  Pt continues to meet criteria to remain in posey vest, non-violent restraint, will continue to assess for need.

## 2017-08-12 NOTE — PLAN OF CARE
"Problem:  Patient Care Overview (Adult)  Goal: Plan of Care Review  Outcome: Ongoing (interventions implemented as appropriate)    08/11/17 2041 08/12/17 0327   Outcome Evaluation   Outcome Summary/Follow up Plan --  Pt remains confused, oriented to self only, remains very restless, up and down out of bed/chair, not following redirections, when helping pt to bathroom became irritable, stated \"you just want to see me naked\" and attempted to push staff out of bathroom.   Coping/Psychosocial Response Interventions   Plan Of Care Reviewed With patient --    Coping/Psychosocial   Patient Agreement with Plan of Care unable to participate  (confused) --          08/11/17 2041 08/12/17 0327   Outcome Evaluation   Outcome Summary/Follow up Plan --  Pt remains confused, oriented to self only, remains very restless, up and down out of bed/chair, not following redirections, when helping pt to bathroom became irritable, stated \"you just want to see me naked\" and attempted to push staff out of bathroom. Compliant and cooperative, will continue to monitor changes in mood and behaviors, provide feedback and support.   Coping/Psychosocial Response Interventions   Plan Of Care Reviewed With patient --    Coping/Psychosocial   Patient Agreement with Plan of Care unable to participate  (confused) --          Problem:  Overarching Goals  Goal: Adheres to Safety Considerations for Self and Others  Intervention: Develop/maintain Individualized Safety Plan    08/11/17 2041   Provide Emotional/Physical Safety   Suicidal Ideation no   Willingness to Contact Staff Member if Feeling Like Hurting Self yes   Mental Health Homicide Risk   Homicidal Ideation no   Willingness to Contact Staff Member if Feeling Like Hurting Others yes   Safety   Safety Measures safety rounds completed;suicide assessment completed         Goal: Optimized Coping Skills in Response to Life Stressors  Intervention: Promote Effective Coping Strategies    08/11/17 " 2041   Coping Strategies   Supportive Measures active listening utilized;verbalization of feelings encouraged         Goal: Develops/Participates in Therapeutic [place] to Support Successful Transition  Intervention: Foster Therapeutic [place]    08/11/17 2041   Coping Strategies   Trust Relationship/Rapport care explained;choices provided;empathic listening provided;emotional support provided;questions answered;questions encouraged;reassurance provided;thoughts/feelings acknowledged       Intervention: Mutually Develop Transition Plan    08/11/17 2041   OTHER   Transition Support crisis management plan promoted

## 2017-08-13 NOTE — PLAN OF CARE
Problem:  Patient Care Overview (Adult)  Goal: Plan of Care Review  Outcome: Ongoing (interventions implemented as appropriate)    08/13/17 1417   Patient Care Overview   Progress no change   Outcome Evaluation   Outcome Summary/Follow up Plan PT remains in posey vest for personal safety. Confused and orientated to self only. Incontinent of urine, cleaned and changed.PT doesn't do well with females performing changing him. Compliant with meds, swallows whole with chocolate milk. Slept until 1100 hrs. Slept after lunch.. Will get PT up in chair prior to dinner. Will continue to monitor for changes in mood/behaviors, provide feedback and support.    Coping/Psychosocial Response Interventions   Plan Of Care Reviewed With patient   Coping/Psychosocial   Patient Agreement with Plan of Care unable to participate       Goal: Interdisciplinary Rounds/Family Conference  Outcome: Ongoing (interventions implemented as appropriate)  Goal: Individualization and Mutuality  Outcome: Ongoing (interventions implemented as appropriate)  Goal: Discharge Needs Assessment  Outcome: Ongoing (interventions implemented as appropriate)    Problem:  Overarching Goals  Goal: Adheres to Safety Considerations for Self and Others  Outcome: Ongoing (interventions implemented as appropriate)  Goal: Optimized Coping Skills in Response to Life Stressors  Outcome: Ongoing (interventions implemented as appropriate)  Goal: Develops/Participates in Therapeutic [place] to Support Successful Transition  Outcome: Ongoing (interventions implemented as appropriate)

## 2017-08-13 NOTE — PLAN OF CARE
Problem:  Patient Care Overview (Adult)  Goal: Plan of Care Review  Outcome: Ongoing (interventions implemented as appropriate)    08/12/17 2058 08/13/17 0322   Outcome Evaluation   Outcome Summary/Follow up Plan --  pt remains confused, oriented to self only, responding to internal stimuli, continue to try and get up out of bed, became aggressive/agitated with staff during ciro-care, attempted to hit staff and was verbally aggressive. Pt easy to redirect, cooperative and compliant with medication, remains restless throughout the shift, will continue to monitor changes in mood and behaviors, provide feedback and support.   Coping/Psychosocial Response Interventions   Plan Of Care Reviewed With patient --    Coping/Psychosocial   Patient Agreement with Plan of Care unable to participate  (confused) --          Problem:  Overarching Goals  Goal: Adheres to Safety Considerations for Self and Others  Intervention: Develop/maintain Individualized Safety Plan    08/12/17 2058   Provide Emotional/Physical Safety   Suicidal Ideation no   Willingness to Contact Staff Member if Feeling Like Hurting Self yes   Mental Health Homicide Risk   Homicidal Ideation no   Willingness to Contact Staff Member if Feeling Like Hurting Others yes   Safety   Safety Measures safety rounds completed;suicide assessment completed         Goal: Optimized Coping Skills in Response to Life Stressors  Intervention: Promote Effective Coping Strategies    08/12/17 2058   Coping Strategies   Supportive Measures active listening utilized;verbalization of feelings encouraged;decision-making supported         Goal: Develops/Participates in Therapeutic [place] to Support Successful Transition  Intervention: Foster Therapeutic [place]    08/12/17 2058   Coping Strategies   Trust Relationship/Rapport care explained;choices provided;emotional support provided;empathic listening provided;questions answered;questions encouraged;reassurance  provided;thoughts/feelings acknowledged       Intervention: Mutually Develop Transition Plan    08/12/17 2058   OTHER   Transition Support crisis management plan promoted

## 2017-08-13 NOTE — PLAN OF CARE
Problem: SAFETY - NON-VIOLENT RESTRAINT  Goal: Remains free of injury from restraints (Non-Violent Restraint)  Outcome: Ongoing (interventions implemented as appropriate)  PT is confused and gait is unsteady. Requires one on one observation to prevent falls.  Goal: Free from restraint(s) (Non-Violent Restraint)  Outcome: Ongoing (interventions implemented as appropriate)  PT is confused and gait is unsteady

## 2017-08-13 NOTE — PLAN OF CARE
Problem: SAFETY - NON-VIOLENT RESTRAINT  Goal: Remains free of injury from restraints (Non-Violent Restraint)  Outcome: Ongoing (interventions implemented as appropriate)  Pt continue to remain free from injury while restrained in a posey vest, will continue to assess and monitor.

## 2017-08-13 NOTE — PLAN OF CARE
Problem:  Patient Care Overview (Adult)  Goal: Plan of Care Review  Outcome: Ongoing (interventions implemented as appropriate)    08/12/17 2013   Patient Care Overview   Progress no change   Outcome Evaluation   Outcome Summary/Follow up Plan PT is confused, unable to assess mental health issues. Orientated to self only. Compliant with medications. PT having audio and visual hallucinations. Difficult to redirect at times. Remains in posey for his safety. Will continue to monitor for changes in mood/behaviors, provide feedback and support..    Coping/Psychosocial Response Interventions   Plan Of Care Reviewed With patient   Coping/Psychosocial   Patient Agreement with Plan of Care unable to participate       Goal: Interdisciplinary Rounds/Family Conference  Outcome: Ongoing (interventions implemented as appropriate)  Goal: Individualization and Mutuality  Outcome: Ongoing (interventions implemented as appropriate)  Goal: Discharge Needs Assessment  Outcome: Ongoing (interventions implemented as appropriate)    Problem:  Overarching Goals  Goal: Adheres to Safety Considerations for Self and Others  Outcome: Ongoing (interventions implemented as appropriate)  Goal: Optimized Coping Skills in Response to Life Stressors  Outcome: Ongoing (interventions implemented as appropriate)  Goal: Develops/Participates in Therapeutic [place] to Support Successful Transition  Outcome: Ongoing (interventions implemented as appropriate)

## 2017-08-13 NOTE — PLAN OF CARE
Problem: SAFETY - NON-VIOLENT RESTRAINT  Goal: Free from restraint(s) (Non-Violent Restraint)  Outcome: Ongoing (interventions implemented as appropriate)  Pt continues to meet criteria to remain in a posey d/t limiting mobility, will continue to assess the need.

## 2017-08-13 NOTE — PROGRESS NOTES
The patient continues to sleep poorly and continues to require posy restraint.  He continues to exhibit some resistance to care.  With regards to his sleep I would like to add Ativan 1 mg at bedtime to see if that might improve his sleep status.  Upper titration of other medications is also to be considered though the patient is already on a fairly aggressive dose of Zyprexa and Depakote.  I will go ahead and increase the patient's Depakote to 1000 mg twice daily as yesterday's Depakote level was subtherapeutic.

## 2017-08-14 NOTE — PLAN OF CARE
Problem:  Patient Care Overview (Adult)  Goal: Plan of Care Review  Outcome: Ongoing (interventions implemented as appropriate)    08/13/17 2225 08/14/17 0343   Outcome Evaluation   Outcome Summary/Follow up Plan --  Pt continues to meet criteria to remain in posey vest to limit mobility, confused, oriented to self only, appears to be responding to internal stimuli, pt has been cooperative with hands on care and compliant with medications, newly scheduled ativan seemed to help pt remain calm and get some sleep, will continue to monitor changes in mood and behaviors, provide feedback and support.   Coping/Psychosocial Response Interventions   Plan Of Care Reviewed With patient --    Coping/Psychosocial   Patient Agreement with Plan of Care unable to participate --          Problem:  Overarching Goals  Goal: Adheres to Safety Considerations for Self and Others  Intervention: Develop/maintain Individualized Safety Plan    08/13/17 2225   Provide Emotional/Physical Safety   Suicidal Ideation no   Willingness to Contact Staff Member if Feeling Like Hurting Self yes   Mental Health Homicide Risk   Homicidal Ideation no   Willingness to Contact Staff Member if Feeling Like Hurting Others yes   Safety   Safety Measures safety rounds completed;suicide assessment completed         Goal: Optimized Coping Skills in Response to Life Stressors  Intervention: Promote Effective Coping Strategies    08/13/17 2225   Coping Strategies   Supportive Measures active listening utilized;verbalization of feelings encouraged;problem solving facilitated         Goal: Develops/Participates in Therapeutic [place] to Support Successful Transition  Intervention: Foster Therapeutic [place]    08/13/17 2225   Coping Strategies   Trust Relationship/Rapport care explained;choices provided;emotional support provided;empathic listening provided;questions answered;questions encouraged;reassurance provided;thoughts/feelings acknowledged        Intervention: Mutually Develop Transition Plan    08/13/17 2254   OTHER   Transition Support crisis management plan promoted           Problem: SAFETY - NON-VIOLENT RESTRAINT  Goal: Remains free of injury from restraints (Non-Violent Restraint)  Outcome: Ongoing (interventions implemented as appropriate)  Pt continues to remain free from injury while in posey vest, will continue to assess and monitor pt.  Goal: Free from restraint(s) (Non-Violent Restraint)  Outcome: Ongoing (interventions implemented as appropriate)  Pt continues to meet criteria of being in posey vest (non-violent restraint) to limit mobility, will continue to assess and monitor.

## 2017-08-14 NOTE — PROGRESS NOTES
Continued Stay Note  [facility]     Patient Name: Joselin Aleman  MRN: [id number]  Today's Date: 8/14/2017    Admit Date: 8/6/2017          Discharge Plan       08/14/17 1131    Case Management/Social Work Plan    Plan NHP with [facility] ramos in palliative unit    Additional Comments Spoke with pt's daughter and [facility] nurse Ruth.   Ruth plans to follow up with pt's daughter and pt's progress on Wednesday 8/16.   Discussed with pt's daughter that tx team is assessing pt's progress to form a D/C plan. informed pt's daughter that SW placed call this AM to [facility] to check bed availablity  and  [facility] not have any LTC beds available at this time.    Informed pt's daughter that when  pt is appropriate SW'er will make referrals to NH's for  LTC placement with [facility] to follow..              Discharge Codes     None            RASHEL Razo

## 2017-08-14 NOTE — PROGRESS NOTES
The patient continues to require a Posey restraint, but is sleeping soundly this morning after sleeping 8 hours last evening.  This is promising.  I have spoken with the patient's daughter today regarding her treatment plan, the patient's prognosis etc.

## 2017-08-14 NOTE — CONSULTS
Met again as planned today with staff/CCP and daughter. Patient has had some improvement with medication adjustment. Daughter would like to have treatment continue another 2 days, then reassess again for [facility] services  at [facility]. This nurse will return on Wednesday at 11:00 am.  Thank you for this referral.  Ruth BenitezRN  [facility]  [phone]

## 2017-08-14 NOTE — PLAN OF CARE
Problem:  Patient Care Overview (Adult)  Goal: Plan of Care Review  Outcome: Ongoing (interventions implemented as appropriate)    08/14/17 1043 08/14/17 1531   Outcome Evaluation   Outcome Summary/Follow up Plan --  Pt compliant with care throughout shift. [place] vest removed at 1010 due to pt calm, sleeping soundly, and not agitated. Pt tolerated being out of restraints. Slept until 1600 and woken for assessment of toileting needs. Compliant with medications. Confused, but denied rai/del, SI/HI, anx/dep. Oriented only to self. [facility]. Given late lunch and content to watch tv up in chair. Will continue to monitor and provide support.   Coping/Psychosocial Response Interventions   Plan Of Care Reviewed With patient --    Coping/Psychosocial   Patient Agreement with Plan of Care unable to participate --        Goal: Interdisciplinary Rounds/Family Conference  Outcome: Ongoing (interventions implemented as appropriate)  Goal: Individualization and Mutuality  Outcome: Ongoing (interventions implemented as appropriate)  Goal: Discharge Needs Assessment  Outcome: Ongoing (interventions implemented as appropriate)    Problem:  Overarching Goals  Goal: Adheres to Safety Considerations for Self and Others  Outcome: Ongoing (interventions implemented as appropriate)  Intervention: Develop/maintain Individualized Safety Plan    08/14/17 1043 08/14/17 1400   Provide Emotional/Physical Safety   Suicidal Ideation no --    Willingness to Contact Staff Member if Feeling Like Hurting Self yes --    Mental Health Homicide Risk   Homicidal Ideation no --    Willingness to Contact Staff Member if Feeling Like Hurting Others yes --    Safety   Safety Measures --  safety rounds completed         Goal: Optimized Coping Skills in Response to Life Stressors  Outcome: Ongoing (interventions implemented as appropriate)  Intervention: Promote Effective Coping Strategies    08/14/17 1043   Coping Strategies    Supportive Measures active listening utilized;verbalization of feelings encouraged;positive reinforcement provided;self-care encouraged         Goal: Develops/Participates in Therapeutic [place] to Support Successful Transition  Outcome: Ongoing (interventions implemented as appropriate)  Intervention: Foster Therapeutic [place]    08/14/17 1043   Coping Strategies   Trust Relationship/Rapport care explained;choices provided;emotional support provided;empathic listening provided;questions answered;questions encouraged;reassurance provided;thoughts/feelings acknowledged       Intervention: Mutually Develop Transition Plan    08/14/17 1043   OTHER   Transition Support crisis management plan promoted           Problem: Alteration in Thoughts and Perception  Goal: Treatment Goal: Gain control of psychotic behaviors/thinking, reduce/eliminate presenting symptoms and demonstrate improved reality functioning upon discharge  Outcome: Ongoing (interventions implemented as appropriate)  Goal: Verbalize thoughts and feelings associated with:  Outcome: Ongoing (interventions implemented as appropriate)  Goal: Refrain from acting on delusional thinking/internal stimuli  Outcome: Ongoing (interventions implemented as appropriate)  Goal: Agree to be compliant with medication regime, as prescribed and report medication side effects  Outcome: Ongoing (interventions implemented as appropriate)  Goal: Attend and participate in unit activities, including therapeutic, recreational, and educational groups  Outcome: Ongoing (interventions implemented as appropriate)  Goal: Recognize dysfunctional thoughts, communicate reality-based thoughts at the time of discharge  Outcome: Ongoing (interventions implemented as appropriate)  Goal: Complete daily ADLs, including personal hygiene independently, as able  Outcome: Ongoing (interventions implemented as appropriate)    Problem: Thought Process Alteration (Adult)  Intervention: Optimize  Communication    08/14/17 1043   Cognitive Interventions   Communication Enhancement Strategies call light answered in person;communication board used;verbal communication attempts encouraged;extra time allowed for response       Intervention: Provide Frequent Orientation/Reorientation    08/14/17 1043   Cognitive Interventions   Orientation Measures calendar in view   Sensory Stimulation Regulation quiet environment promoted;lighting decreased;auditory stimulation minimized         Goal: Identify Related Risk Factors and Signs and Symptoms  Outcome: Ongoing (interventions implemented as appropriate)  Goal: Improved Thought Process  Outcome: Ongoing (interventions implemented as appropriate)    Problem: Fall Risk (Adult)  Intervention: Monitor/Assist with Self Care    08/14/17 1043 08/14/17 1300   Monitor/Assist with Self Care   Ambulation 2-->assistive person --    Transferring 2-->assistive person --    Toileting 2-->assistive person --    Bathing 3-->assistive equipment and person --    Dressing 2-->assistive person --    Eating 0-->independent --    Communication 2-->difficulty understanding (not related to language barrier) --    Swallowing (if score 2 or more for any item, consult Rehab Services) 0-->swallows foods/liquids without difficulty --    Activity   Activity Level of Assistance --  assistance, 2 people   Musculoskeletal Interventions   Self-Care Promotion independence encouraged;BADL personal objects within reach;BADL personal routines maintained --          Goal: Identify Related Risk Factors and Signs and Symptoms  Outcome: Ongoing (interventions implemented as appropriate)

## 2017-08-15 NOTE — PLAN OF CARE
Problem:  Patient Care Overview (Adult)  Goal: Plan of Care Review  Outcome: Ongoing (interventions implemented as appropriate)    08/15/17 0311   Patient Care Overview   Progress no change   Outcome Evaluation   Outcome Summary/Follow up Plan Pt intermittently restless for beginning of shift. He was confused, but redirectable. Did have one brief moment of agitation while attempting to use urinal., but able to calm with redirection. PT medicated for pain & insomnia with PRNs. Continuing to monitor and support.    Coping/Psychosocial Response Interventions   Plan Of Care Reviewed With patient   Coping/Psychosocial   Patient Agreement with Plan of Care unable to participate       Goal: Individualization and Mutuality  Outcome: Ongoing (interventions implemented as appropriate)    08/15/17 0311   Behavioral Health Screens   Patient Personal Strengths family/social support         Problem:  Overarching Goals  Goal: Adheres to Safety Considerations for Self and Others  Intervention: Develop/maintain Individualized Safety Plan    08/15/17 0311   Provide Emotional/Physical Safety   Suicidal Ideation no   Willingness to Contact Staff Member if Feeling Like Hurting Self yes   Mental Health Homicide Risk   Homicidal Ideation no   Willingness to Contact Staff Member if Feeling Like Hurting Others yes   Safety   Safety Measures suicide assessment completed;safety rounds completed         Goal: Optimized Coping Skills in Response to Life Stressors  Intervention: Promote Effective Coping Strategies    08/15/17 0311   Coping Strategies   Supportive Measures verbalization of feelings encouraged

## 2017-08-15 NOTE — PROGRESS NOTES
"The patient did not sleep quite as well last evening, with staff reporting his sleep is \"broken\".  He has remained out of the Posey for the past 48 hours.  We continue to work towards disposition.  "

## 2017-08-15 NOTE — PLAN OF CARE
Problem:  Patient Care Overview (Adult)  Goal: Plan of Care Review  Outcome: Ongoing (interventions implemented as appropriate)    08/15/17 1038 08/15/17 1515   Outcome Evaluation   Outcome Summary/Follow up Plan --  Pt pleasant throughout shift and cooperative with care. Compliant with medications. Confused and oriented to self only. Pt denied SI/HI, rai/del, anx/dep. Slept soundly in bed on and off. Sat in chair in lounge. Will continue to monitor and provide support.   Coping/Psychosocial Response Interventions   Plan Of Care Reviewed With patient --    Coping/Psychosocial   Patient Agreement with Plan of Care unable to participate  (pt confused) --        Goal: Interdisciplinary Rounds/Family Conference  Outcome: Ongoing (interventions implemented as appropriate)  Goal: Individualization and Mutuality  Outcome: Ongoing (interventions implemented as appropriate)  Goal: Discharge Needs Assessment  Outcome: Ongoing (interventions implemented as appropriate)    Problem:  Overarching Goals  Goal: Adheres to Safety Considerations for Self and Others  Outcome: Ongoing (interventions implemented as appropriate)  Intervention: Develop/maintain Individualized Safety Plan    08/15/17 1038 08/15/17 1400   Provide Emotional/Physical Safety   Suicidal Ideation no --    Willingness to Contact Staff Member if Feeling Like Hurting Self yes --    Mental Health Homicide Risk   Homicidal Ideation no --    Willingness to Contact Staff Member if Feeling Like Hurting Others yes --    Safety   Safety Measures --  safety rounds completed         Goal: Optimized Coping Skills in Response to Life Stressors  Outcome: Ongoing (interventions implemented as appropriate)  Intervention: Promote Effective Coping Strategies    08/15/17 1038   Coping Strategies   Supportive Measures active listening utilized;verbalization of feelings encouraged;self-care encouraged         Goal: Develops/Participates in Therapeutic [place] to Support  Successful Transition  Outcome: Ongoing (interventions implemented as appropriate)  Intervention: Foster Therapeutic [place]    08/15/17 1038   Coping Strategies   Trust Relationship/Rapport choices provided;care explained;emotional support provided;empathic listening provided;questions answered;questions encouraged;reassurance provided;thoughts/feelings acknowledged       Intervention: Mutually Develop Transition Plan    08/15/17 1038   OTHER   Transition Support crisis management plan promoted           Problem: Fall Risk (Adult)  Intervention: Monitor/Assist with Self Care    08/15/17 1038 08/15/17 1515   Activity   Activity Level of Assistance --  assistance, 1 person   Monitor/Assist with Self Care   Ambulation 2-->assistive person --    Transferring 2-->assistive person --    Toileting 2-->assistive person --    Bathing 2-->assistive person --    Dressing 2-->assistive person --    Eating 0-->independent --    Communication 2-->difficulty understanding (not related to language barrier) --    Swallowing (if score 2 or more for any item, consult Rehab Services) 0-->swallows foods/liquids without difficulty --    Musculoskeletal Interventions   Self-Care Promotion independence encouraged;BADL personal objects within reach;BADL personal routines maintained --        Intervention: Reduce Risk/Promote Restraint Free Environment    08/15/17 1400   Safety Interventions   Safety/Security Measures bed alarm set   Environmental Safety Modification assistive device/personal items within reach;clutter free environment maintained;room near unit station   Restraint Interventions   Safety Promotion/Fall Prevention safety round/check completed;fall prevention program maintained;nonskid shoes/slippers when out of bed         Goal: Identify Related Risk Factors and Signs and Symptoms  Outcome: Ongoing (interventions implemented as appropriate)

## 2017-08-16 NOTE — PROGRESS NOTES
We continue to struggle to control the patient's problematic behaviors.  He is once again in a Posey restraint.  As the patient is already receiving fairly aggressive pharmacotherapy, it is my belief that further increases in Zyprexa and or Zyprexa would be negligible in their potential effect.  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

## 2017-08-16 NOTE — CONSULTS
Chart reviewed. Met with daughter- she is aware of patient changes overnight and new medication being started; informed her this nurse will follow up with staff on Friday for a progress update. Met with staff CCPs and nurse. Please call if services needed sooner.  Ruth Benitez RN  [facility] [place]  [phone]

## 2017-08-16 NOTE — PLAN OF CARE
Problem:  Patient Care Overview (Adult)  Goal: Plan of Care Review  Outcome: Ongoing (interventions implemented as appropriate)    08/16/17 0510   Patient Care Overview   Consent Given to Review Plan with Pt confused.    Outcome Evaluation   Outcome Summary/Follow up Plan Pt confused and irritable this shift. Pt compliant with meds. Pt had to be placed in a posey vest this shift. Pt was continously getting OOB without assistance. Pt continues to be confused and orinted to self only. Pt currently in bed with eyes closed, respirations even end unlabored. Will continue to monitor.    Coping/Psychosocial Response Interventions   Plan Of Care Reviewed With patient   Coping/Psychosocial   Patient Agreement with Plan of Care unable to participate;other (see comments)         Problem:  Overarching Goals  Goal: Adheres to Safety Considerations for Self and Others  Outcome: Ongoing (interventions implemented as appropriate)    08/16/17 0510   Overarching Goals   Adheres to Safety Considerations Promote/provide immediate and ongoing protective physical environment. Provide environemntal rounds/enviornement of care safety checks at the change of shift and situational/prn as needed.        Goal: Optimized Coping Skills in Response to Life Stressors  Outcome: Ongoing (interventions implemented as appropriate)    08/16/17 0510   Overarching Goals   Optimized Coping Skills Identify current effecitve/ineffective coping strategies. Assist with developing/using positive coping strategies.        Goal: Develops/Participates in Therapeutic [place] to Support Successful Transition  Outcome: Ongoing (interventions implemented as appropriate)    08/16/17 0510   Patient Care Overview   Develop/Participate Therapeutic [place] Establish rapport; develop trust relationship. Provide emotional support.          Problem: SAFETY - NON-VIOLENT RESTRAINT  Goal: Remains free of injury from restraints (Non-Violent Restraint)  Outcome: Ongoing  (interventions implemented as appropriate)  Less restrictive measures have been tried ans were not effective prior to placing pt in a posey vest.  Have monitored pt's safety, psychosocial status, comfort, nutrition, and hydration at least every 2 hours and will continue to monitor.    Goal: Free from restraint(s) (Non-Violent Restraint)  Outcome: Ongoing (interventions implemented as appropriate)  Pt remains in posey vest.  Pt was continuously getting OOB without assistance.  Pt very unsteady on feet.  Staff was unable to keep pt in bed.  Educated pt the importance of calling for assistance prior to ambulating.  Pt continued to get OOB without assistance.  Will continue to monitor.

## 2017-08-16 NOTE — PLAN OF CARE
Problem:  Patient Care Overview (Adult)  Goal: Plan of Care Review  Outcome: Ongoing (interventions implemented as appropriate)    08/16/17 1900   Patient Care Overview   Progress no change   Outcome Evaluation   Outcome Summary/Follow up Plan PT CONTINUE TO BE CONFUSED. RESTLESSNESS NOTED BUT NO COMBATIVE BX'S NOTED THIS SHIFT. CONTINUES TO BE IN POSEY VEST RESTRAINT DUE TO NOT MEETING REMOVER CRITERIA. PT CONSTANTLY TRYING TO REMOVE RESTAINTS WHILE AWAKE. PT CONTINUES TO ONLY BE ONLY ALERT TO SELF WITH REORIENTATION TO PLACE, TIME AND SITUATION. NO PAIN ASSESSED THIS SHIFT. PT IN BED MOST OF THIS SHIFT. NO FALLS NOTED AT THIS TIME. ASSISTANCE X2 NEEDED FOR ALL ADL'S. WILL CONTINUE TO MONITOR PT AND NOTE ANY CHANGES.   Coping/Psychosocial Response Interventions   Plan Of Care Reviewed With patient   Coping/Psychosocial   Patient Agreement with Plan of Care agrees         Problem: Thought Process Alteration (Adult)  Goal: Improved Thought Process  Outcome: Ongoing (interventions implemented as appropriate)    Problem: Fall Risk (Adult)  Goal: Identify Related Risk Factors and Signs and Symptoms  Outcome: Ongoing (interventions implemented as appropriate)

## 2017-08-16 NOTE — PLAN OF CARE
Problem: BH Patient Care Overview (Adult)  Goal: Interdisciplinary Rounds/Family Conference  Outcome: Ongoing (interventions implemented as appropriate)    08/16/17 0923   Interdisciplinary Rounds/Family Conf   Summary Treatment team met to review pt's plan of care. Pt is requiring a higher level of care but SW unable to make referrals due to pt remaining in restraints. Family meeting with [facility] again today to evaluate for palliative care. Plan to continue to moniter meds and SW will begin referrals when pt is more stable and out of restraints.    Participants art therapy;;nursing;pharmacy;social work      Patient/Guardian Signature: __________________________________             Psychiatrist Signature: ______________________________________             Therapist Signature: ________________________________________              Nurse Signature: ___________________________________________              Staff Signature: ____________________________________________             Staff Signature: ____________________________________________              Staff Signature: ____________________________________________              Staff Signature:                                                                                                        Problem: Alteration in Thoughts and Perception  Goal: Verbalize thoughts and feelings associated with:  Outcome: Ongoing (interventions implemented as appropriate)  Goal: Refrain from acting on delusional thinking/internal stimuli  Outcome: Ongoing (interventions implemented as appropriate)  Goal: Agree to be compliant with medication regime, as prescribed and report medication side effects  Outcome: Ongoing (interventions implemented as appropriate)  Goal: Attend and participate in unit activities, including therapeutic, recreational, and educational groups  Outcome: Ongoing (interventions implemented as appropriate)  Goal: Recognize dysfunctional thoughts,  communicate reality-based thoughts at the time of discharge  Outcome: Ongoing (interventions implemented as appropriate)  Goal: Complete daily ADLs, including personal hygiene independently, as able  Outcome: Ongoing (interventions implemented as appropriate)    Problem: Thought Process Alteration (Adult)  Goal: Improved Thought Process  Outcome: Ongoing (interventions implemented as appropriate)    08/16/17 0946   Thought Process Alteration (Adult)   Improved Thought Process making progress toward outcome

## 2017-08-17 NOTE — PLAN OF CARE
Problem:  Patient Care Overview (Adult)  Goal: Plan of Care Review  Outcome: Ongoing (interventions implemented as appropriate)    08/16/17 0510 08/17/17 0545 08/17/17 0645   Patient Care Overview   Consent Given to Review Plan with Pt confused.  --  --    Progress --  --  no change   Outcome Evaluation   Outcome Summary/Follow up Plan --  --  Unable to assess anxiety, depression, SI/HI, and hallucinations r/t confusion. Pt very restless throughout shift. Compliant with medication regimen. Continue to monitor and assess.    Coping/Psychosocial Response Interventions   Plan Of Care Reviewed With --  patient --    Coping/Psychosocial   Patient Agreement with Plan of Care --  unable to participate --        Goal: Interdisciplinary Rounds/Family Conference  Outcome: Ongoing (interventions implemented as appropriate)  Goal: Individualization and Mutuality  Outcome: Ongoing (interventions implemented as appropriate)  Goal: Discharge Needs Assessment  Outcome: Ongoing (interventions implemented as appropriate)    Problem:  Overarching Goals  Goal: Adheres to Safety Considerations for Self and Others  Outcome: Ongoing (interventions implemented as appropriate)  Intervention: Develop/maintain Individualized Safety Plan    08/16/17 0335 08/17/17 0545   Provide Emotional/Physical Safety   Suicidal Ideation --  (unable to assess)   Willingness to Contact Staff Member if Feeling Like Hurting Self yes --    Mental Health Homicide Risk   Homicidal Ideation --  (unable to assess)   Willingness to Contact Staff Member if Feeling Like Hurting Others yes --    Safety   Safety Measures --  safety rounds completed;suicide assessment completed         Goal: Optimized Coping Skills in Response to Life Stressors  Outcome: Ongoing (interventions implemented as appropriate)  Intervention: Promote Effective Coping Strategies    08/17/17 0545   Coping Strategies   Supportive Measures active listening utilized;positive reinforcement  provided;self-care encouraged;self-reflection promoted;self-responsibility promoted;verbalization of feelings encouraged         Goal: Develops/Participates in Therapeutic [place] to Support Successful Transition  Outcome: Ongoing (interventions implemented as appropriate)  Intervention: Foster Therapeutic [place]    08/17/17 0545   Coping Strategies   Trust Relationship/Rapport care explained;choices provided;emotional support provided;empathic listening provided;questions answered;questions encouraged;reassurance provided;thoughts/feelings acknowledged       Intervention: Mutually Develop Transition Plan    08/17/17 0545   OTHER   Transition Support crisis management plan promoted           Problem: SAFETY - NON-VIOLENT RESTRAINT  Goal: Remains free of injury from restraints (Non-Violent Restraint)  Outcome: Ongoing (interventions implemented as appropriate)  Goal: Free from restraint(s) (Non-Violent Restraint)  Outcome: Ongoing (interventions implemented as appropriate)

## 2017-08-17 NOTE — PLAN OF CARE
Problem: Fall Risk (Adult)  Intervention: Review Medications/Identify Contributors to Fall Risk  PT remains in posey vest for his personal safety due to confusion and restlessness.      Problem: SAFETY - NON-VIOLENT RESTRAINT  Goal: Remains free of injury from restraints (Non-Violent Restraint)  Outcome: Ongoing (interventions implemented as appropriate)  PT remains in posey vest for his personal safety due to confusion and restlessness.  Goal: Free from restraint(s) (Non-Violent Restraint)  Outcome: Ongoing (interventions implemented as appropriate)  PT remains in posey vest for his personal safety due to confusion and restlessness.

## 2017-08-17 NOTE — PROGRESS NOTES
The patient continues to sleep poorly.  He continues to require a Posey restraint which is been reordered as of today.  At this point we are continuing her trial of Nuedexta in hopes of addressing ongoing problematic behaviors.  Consideration may be given to further upper titration of Zyprexa or addition of a benzodiazepine if we don't see any response to the addition of Nuedexta.

## 2017-08-18 PROBLEM — I51.9 DIASTOLIC DYSFUNCTION, LEFT VENTRICLE: Status: ACTIVE | Noted: 2017-01-01

## 2017-08-18 PROBLEM — G30.9 ALZHEIMER'S DEMENTIA WITH BEHAVIORAL DISTURBANCE (HCC): Status: ACTIVE | Noted: 2017-01-01

## 2017-08-18 PROBLEM — Z51.5 HOSPICE CARE: Status: ACTIVE | Noted: 2017-01-01

## 2017-08-18 PROBLEM — F02.818 ALZHEIMER'S DEMENTIA WITH BEHAVIORAL DISTURBANCE (HCC): Status: ACTIVE | Noted: 2017-01-01

## 2017-08-18 NOTE — PLAN OF CARE
Problem: Fall Risk (Adult)  Intervention: Reduce Risk/Promote Restraint Free Environment  PT in nonviolent restraint posey vest for safety due to his confusion.      Problem: SAFETY - NON-VIOLENT RESTRAINT  Goal: Remains free of injury from restraints (Non-Violent Restraint)  Outcome: Unable to achieve outcome(s) by discharge Date Met:  08/18/17  PT in nonviolent restraint posey vest for safety due to his confusion.  Goal: Free from restraint(s) (Non-Violent Restraint)  Outcome: Unable to achieve outcome(s) by discharge Date Met:  08/18/17  PT in nonviolent restraint posey vest for safety due to his confusion.

## 2017-08-18 NOTE — H&P
Palliative Care/Hospice Admit/Consult Note      Referring Provider: Yosef Holly MD   Reason for Consultation: Hospice Care  Date of Admission:  8/18/2017    Patient Care Team:  Catarina Strange MD as PCP - General (Family Medicine)    Chief complaint:  Dementia    History of present illness:  The patient is a 84 y.o. male with a history of dementia dating back to 2010.  He has been a resident at [facility] for some time.  He apparently will not be able to return to that facility as he is now thought to be in need of a higher level of care.  Upon admission, the family was considering pursuing a palliative consultation.  The patient was diagnosed with an acute urinary tract infection and was treated.  His current psychotropic medications include mirtazapine Namenda, Seroquel and Depakote.  The patient was admitted to the [facility] approximately 10 days prior to his CM[facility] admission with worsening agitation.  The patient has not demonstrated any significant improvement since acute care admission 8/3/2017 and CMU admission 8/6/2017.  Subsequently, [facility] evaluated and all agreed to admit the patient as a HSB.    I met the patient at the time of his transfer to the floor.  He was helped from the stretcher to the bed.  I tried to examine him sitting up but he was ready to lie down.  No specific + ROS obtainable.  I was able to talk with his daughter and I reviewed all.      Review of Systems  Pertinent items are noted in HPI       History  Past Medical History:   Diagnosis Date   • COPD (chronic obstructive pulmonary disease)    • Dementia    , No past surgical history on file., No family history on file. and   Social History   Substance Use Topics   • Smoking status: Former Smoker     Types: Cigarettes   • Smokeless tobacco: Not on file   • Alcohol use No       Vital Signs   Temp:  [98.5 °F (36.9 °C)] 98.5 °F (36.9 °C)  Heart Rate:  [69-90] 72  Resp:  [16-18] 18  BP: (113-145)/(60-87) 145/83  O2  Device: room air SpO2:  [94 %-100 %] 94 %    Physical Exam:     General Appearance:    Awake and cooperative and appears in no acute distress   Head:    Normocephalic, without obvious abnormality, atraumatic   Eyes:            Lids and lashes normal, conjunctivae and sclerae normal, no   icterus   Ears:    Ears appear intact with no abnormalities noted   Throat:   No oral lesions, oral mucosa moist   Neck:   No adenopathy, supple, trachea midline, no thyromegaly   Back:     No obvious scoliosis present   Lungs:     Clear to auscultation, respirations regular, even and not labored    Heart:    Regular rhythm and normal rate   Chest Wall:    No abnormalities observed   Abdomen:     Normal bowel sounds, no masses, no organomegaly, soft        non-tender, non-distended, no guarding, no rebound                tenderness   Rectal:     Deferred   Extremities:   Moves all extremities well, no edema, no cyanosis   Pulses:   Radial pulses palpable and equal bilaterally   Skin:   No bleeding       Neurologic:   Awake and no focal weaknesses noted         Results Review:   I reviewed the patient's new clinical results.      Impression:  Principal Problem:    Alzheimer's dementia with behavioral disturbance  Active Problems:    Hospice care    COPD (chronic obstructive pulmonary disease)    Diastolic dysfunction, left ventricle    HTN (hypertension)        Plan:  I reviewed all with the patient's daughter.  She wishes her dad to be kept comfortable.  I explained that we will treat his symptoms that he exhibits to make sure that he is comfortable.  AND/DNR.  I answered all her questions.      Jarocho Moscoso MD  08/18/17  3:54 PM

## 2017-08-18 NOTE — PLAN OF CARE
Problem: BH Patient Care Overview (Adult)  Goal: Plan of Care Review  Outcome: Ongoing (interventions implemented as appropriate)    08/17/17 2013   Patient Care Overview   Progress no change   Outcome Evaluation   Outcome Summary/Follow up Plan PT remains confused and in a posey for safety. Unable to assess mental health issues at this time. Restless with hallucinations. Compliant with medications. Will con't to monitor and provide support.    Coping/Psychosocial Response Interventions   Plan Of Care Reviewed With patient   Coping/Psychosocial   Patient Agreement with Plan of Care agrees       Goal: Interdisciplinary Rounds/Family Conference  Outcome: Ongoing (interventions implemented as appropriate)  Goal: Individualization and Mutuality  Outcome: Ongoing (interventions implemented as appropriate)  Goal: Discharge Needs Assessment  Outcome: Ongoing (interventions implemented as appropriate)    Problem:  Overarching Goals  Goal: Adheres to Safety Considerations for Self and Others  Outcome: Ongoing (interventions implemented as appropriate)  Goal: Optimized Coping Skills in Response to Life Stressors  Outcome: Ongoing (interventions implemented as appropriate)  Goal: Develops/Participates in Therapeutic [place] to Support Successful Transition  Outcome: Ongoing (interventions implemented as appropriate)

## 2017-08-18 NOTE — PLAN OF CARE
Problem: SAFETY - NON-VIOLENT RESTRAINT  Goal: Free from restraint(s) (Non-Violent Restraint)  Outcome: Ongoing (interventions implemented as appropriate)  Pt continues to be confused and tries to ambulate and transfer without assistance.  Pt continues to require posey vest at this time.  Will continue to monitor.

## 2017-08-18 NOTE — PLAN OF CARE
Problem:  Patient Care Overview (Adult)  Goal: Plan of Care Review  Outcome: Ongoing (interventions implemented as appropriate)    08/18/17 0353   Outcome Evaluation   Outcome Summary/Follow up Plan Pt oriented to person only; remains confused. Pt would not rate depression and anxiety. Pt compliant with meds. Pt remains in a posey vest d/t limited mobility. Will continue to monitor.    Coping/Psychosocial Response Interventions   Plan Of Care Reviewed With patient   Coping/Psychosocial   Patient Agreement with Plan of Care unable to participate         Problem:  Overarching Goals  Goal: Adheres to Safety Considerations for Self and Others  Outcome: Ongoing (interventions implemented as appropriate)    08/18/17 0353   Overarching Goals   Adheres to Safety Considerations Promote/provide immediate and ongoing protective physical environment. Provide environemental rounds/environment of care safety checks at the change of shift and situational/prn as needed.        Goal: Optimized Coping Skills in Response to Life Stressors  Outcome: Ongoing (interventions implemented as appropriate)    08/18/17 0353   Overarching Goals   Optimized Coping Skills Identify current effective/ineffective coping strategies. Assist with developing/using positive coping strategies.        Goal: Develops/Participates in Therapeutic [place] to Support Successful Transition  Outcome: Ongoing (interventions implemented as appropriate)    08/18/17 0353   Patient Care Overview   Develop/Participate Therapeutic [place] Provide emotional support. Honor confidentiality.

## 2017-08-18 NOTE — PROGRESS NOTES
The patient slept 4-1/2 hours last night.  He continues to require a Posey restraint.  Staff reports that he seems less restless, and this may represent early response to initiation of Nuedexta.  We continue current treatment and are endeavoring to have the patient be able to be free of his Posey restraint.

## 2017-08-18 NOTE — PLAN OF CARE
Problem: SAFETY - NON-VIOLENT RESTRAINT  Goal: Remains free of injury from restraints (Non-Violent Restraint)  Outcome: Ongoing (interventions implemented as appropriate)  Less restrictive measures have been tried and were not effective.  Pt remains in a posey vest at this time.  Have monitored safety, psychosocial status, comfort, nutrition, and hydration at least every 2 hours and will continue to do so.

## 2017-08-18 NOTE — CONSULTS
Admitted as HSB GIP today.  Reviewed Care Guide at first visit with family on 8/9/17.  Ruth BenitezRn  [facility]  [phone]

## 2017-08-18 NOTE — PROGRESS NOTES
Continued Stay Note  [facility]     Patient Name: Joselin Aleman  MRN: [id number]  Today's Date: 8/18/2017    Admit Date: 8/6/2017          Discharge Plan       08/18/17 1154    Case Management/Social Work Plan    Plan  [facility] palliavtive care unit [facility] Scattered  Bed    Additional Comments Call recieved from Ruth with [facility] stating pt's [facility] Rosetta [facility]'s care and trail on Nuedexta.  Call placed to pt's daughter and per pt's daughter she wants pt to transfer to a HSB for more aggressive  management of pt's restfulness  and impulsiveness.   Call placed to  DR. DEUTSCH to discuss  pt's daughter wish to transfer pt asap to  HSB and Dr. DEUTSCH stated  he is agreeable if this is pt's daughters  wishes.   Pt's daughter will meet with Ruth Coats RN to sign hospitals admission forms and plan to transfer to 4 later this afternoon.               Discharge Codes     None            RASHEL Razo

## 2017-08-18 NOTE — PLAN OF CARE
Problem:  Patient Care Overview (Adult)  Goal: Plan of Care Review  Outcome: Ongoing (interventions implemented as appropriate)    08/18/17 1330   Patient Care Overview   Progress improving   Outcome Evaluation   Outcome Summary/Follow up Plan PT less agitated this AM and the of  amount restlessness, attempting to get up out chair has slightly decreased since this pass weekend. Remains confused , in posey for safety. Unable to assess mental health issues due to confusion. PT is compliant with meds, whole with chocolate milk. Incont of urine cleaned and changed. Orientated to self only. Processing to transfer (discharge) to [facility]. Report called and given to Vinita Lemos RN. statis is stable..   Coping/Psychosocial Response Interventions   Plan Of Care Reviewed With patient   Coping/Psychosocial   Patient Agreement with Plan of Care unable to participate       Goal: Interdisciplinary Rounds/Family Conference  Outcome: Ongoing (interventions implemented as appropriate)  Goal: Individualization and Mutuality  Outcome: Ongoing (interventions implemented as appropriate)  Goal: Discharge Needs Assessment  Outcome: Ongoing (interventions implemented as appropriate)    Problem:  Overarching Goals  Goal: Adheres to Safety Considerations for Self and Others  Outcome: Ongoing (interventions implemented as appropriate)  Goal: Optimized Coping Skills in Response to Life Stressors  Outcome: Ongoing (interventions implemented as appropriate)  Goal: Develops/Participates in Therapeutic [place] to Support Successful Transition  Outcome: Ongoing (interventions implemented as appropriate)

## 2017-08-18 NOTE — PLAN OF CARE
Problem: Dying Patient, Actively (Adult)  Goal: Identify Related Risk Factors and Signs and Symptoms  Outcome: Outcome(s) achieved Date Met:  08/18/17

## 2017-08-18 NOTE — PROGRESS NOTES
Continued Stay Note  [facility]     Patient Name: Joselin Aleman  MRN: [id number]  Today's Date: 8/18/2017    Admit Date: 8/6/2017          Discharge Plan       08/18/17 1431    Case Management/Social Work Plan    Plan HSB    Final Note    Final Note Pt with orders to D/C to 4P to a HSB for pallaitive care.   Pt's daughter agreeable and has signed with [facility] for HSB.         08/18/17 1154    Case Management/Social Work Plan    Plan  [facility] palliavtive care unit [facility] Scattered  Bed    Additional Comments Call recieved from Ruth with [facility] stating pt's [facility] Rosetta [facility] discuss pt's care and trail on Nuedexta.  Call placed to pt's daughter and per pt's daughter she wants pt to transfer to a HSB for more aggressive  management of pt's restfulness  and impulsiveness.   Call placed to  DR. DEUTSCH to discuss  pt's daughter wish to transfer pt asap to  HSB and Dr. DEUTSCH stated  he is agreeable if this is pt's daughters  wishes.   Pt's daughter will meet with Ruth Coats RN to sign [facility] admission forms and plan to transfer to 4 later this afternoon.               Discharge Codes       08/18/17 1436    Discharge Codes    Discharge Codes 51  Hospice - medical facility            RASHEL Razo

## 2017-08-19 NOTE — PLAN OF CARE
Problem: Patient Care Overview (Adult)  Goal: Plan of Care Review  Outcome: Ongoing (interventions implemented as appropriate)    08/18/17 2030   Coping/Psychosocial Response Interventions   Plan Of Care Reviewed With patient;daughter   Patient Care Overview   Progress no change   Outcome Evaluation   Outcome Summary/Follow up Plan Pt tx to [facility] for palliative care. Posey vest restraint removed, pt slept comfortably until restless at shift change. Medicated with sublingual haldol, calmed pt down. IV team called for IV placement. Daughter at bedside. Will continue to monitor and provide comfort care.       Goal: Adult Individualization and Mutuality  Outcome: Ongoing (interventions implemented as appropriate)  Goal: Discharge Needs Assessment  Outcome: Ongoing (interventions implemented as appropriate)    Problem: Dying Patient, Actively (Adult)  Goal: Comfort/Pain Control  Outcome: Ongoing (interventions implemented as appropriate)  Goal: Dying Process, Peace and Dignity  Outcome: Ongoing (interventions implemented as appropriate)    Problem: Pressure Ulcer (Adult)  Goal: Signs and Symptoms of Listed Potential Problems Will be Absent or Manageable (Pressure Ulcer)  Outcome: Ongoing (interventions implemented as appropriate)

## 2017-08-19 NOTE — PROGRESS NOTES
[facility]    Joselin Aleman  [id number]  8/19/2017    Admission R/T [facility] Dx: YES      Reason for [facility] Admission: Alzheimer's disease      Symptom  Management: Restlessness/agitation      Nursing/Medication Recommendations: No recommendations at this time      Psychosocial Issues and Recommendations: Provide support to patient and daughter at the bedside      Spiritual Concerns and Recommendations: None at present       [facility] Discharge Plans:  None at present, continue to monitor for decline, receiving IV medications for restlessness/agitation and is meeting criteria for GIP      Review of Visit: Close monitoring for safety/falls, symptom management of restlessness/agitation, comfort care for declining patient. Patient lying in bed, asleep/sedated, color pale, breathing shallow. Patient medicated earlier in am and roused some per daughter, took a few bites of ice cream and sip of water, now back to sleeping. Discussed care needs with daughter and patient's declining status. She voiced that she wants him to be medicated for comfort and not be restless and agitated. Occasionally has pain in abdomen.  She is aware of his declining status and is accepting. She voiced that he has been eating less and less and sleeping most of the time. Emotional support provided to her and encouraged her to contact [facility] 24/7 for any concerns or questions. Spoke to staff MATY Talamantes regarding care needs and patient has received IV Ativan 1mg x 2 doses in the last 12 hours. Will continue to see daily to assess needs, monitor status and offer support.      Nhung Etienne RN  [facility] Visit Nurse

## 2017-08-19 NOTE — PLAN OF CARE
Problem: Patient Care Overview (Adult)  Goal: Plan of Care Review  Outcome: Ongoing (interventions implemented as appropriate)    08/19/17 6117   Coping/Psychosocial Response Interventions   Plan Of Care Reviewed With patient;daughter   Patient Care Overview   Progress improving   Outcome Evaluation   Outcome Summary/Follow up Plan Pt appears more comfortable today. Pt has not been combative today, nor has he tried to get out of bed. Pt not arousing for safe administration of po meds. Medicated x2 with morphine and ativan prior to f/c insertion and bath. MD would like pt to be premedicated prior to q4h turns to prevent restlessness and agiation to keep restraints off. Daughter is agreeable to this. Will continue to monitor and provide comfort care.       Goal: Adult Individualization and Mutuality  Outcome: Ongoing (interventions implemented as appropriate)  Goal: Discharge Needs Assessment  Outcome: Ongoing (interventions implemented as appropriate)    Problem: Dying Patient, Actively (Adult)  Goal: Comfort/Pain Control  Outcome: Ongoing (interventions implemented as appropriate)  Goal: Dying Process, Peace and Dignity  Outcome: Ongoing (interventions implemented as appropriate)    Problem: Pressure Ulcer (Adult)  Goal: Signs and Symptoms of Listed Potential Problems Will be Absent or Manageable (Pressure Ulcer)  Outcome: Ongoing (interventions implemented as appropriate)

## 2017-08-19 NOTE — PLAN OF CARE
Problem: Patient Care Overview (Adult)  Goal: Plan of Care Review  Outcome: Ongoing (interventions implemented as appropriate)    08/18/17 2030 08/18/17 2045 08/19/17 0353   Coping/Psychosocial Response Interventions   Plan Of Care Reviewed With --  patient;daughter --    Patient Care Overview   Progress no change --  --    Outcome Evaluation   Outcome Summary/Follow up Plan --  --  Maintained comfort measures per palliative care protocol. Patient was meidcated PRN for anxiety and restlessness. No family at bedside at this time. Will continue to monitor vital signs and comfort.       Goal: Adult Individualization and Mutuality  Outcome: Ongoing (interventions implemented as appropriate)  Goal: Discharge Needs Assessment  Outcome: Ongoing (interventions implemented as appropriate)    Problem: Dying Patient, Actively (Adult)  Goal: Comfort/Pain Control  Outcome: Ongoing (interventions implemented as appropriate)  Goal: Dying Process, Peace and Dignity  Outcome: Ongoing (interventions implemented as appropriate)    Problem: Pressure Ulcer (Adult)  Goal: Signs and Symptoms of Listed Potential Problems Will be Absent or Manageable (Pressure Ulcer)  Outcome: Ongoing (interventions implemented as appropriate)

## 2017-08-19 NOTE — PROGRESS NOTES
Palliative Care/Hospice Follow Up Note       LOS: 1 day   Patient Care Team:  Catarina Strange MD as PCP - General (Family Medicine)    Chief Complaint:  Dementia    Interval History:     Patient Complaints: None  Patient Denies:  None  History taken from:  [facility] and RN.    Review of Systems: Review of systems could not be obtained due to patient sedation status.    Palliative Performance Scale  Palliative Performance Scale Score: 40%  [place] Symptom Assessment System Revised  Pain Score: no pain  Tiredness Score: 8  Nausea Score: No nausea  Depression Score: unable to assess  Anxiety Score: No anxiety  Drowsiness Score: 8  Lack of Appetite Score: 5  Wellbeing Score: Best wellbeing  Dyspnea Score: No shortness of breath  Other Problem Score: Best possible response  Source of Information: healthcare professional caregiver  Intervention: medicated/see MAR  Intervention Response: tolerated    Vital Signs  Temp:  [98.3 °F (36.8 °C)] 98.3 °F (36.8 °C)  Heart Rate:  [79] 79  Resp:  [12] 12  BP: (146)/(73) 146/73  O2 Device: room air SpO2:  [94 %-95 %] 95 %    Physical Exam:  General Appearance:    Arouses but not awake and in no acute distress   Throat:   No oral lesions, oral mucosa moist   Neck:   No adenopathy, supple, trachea midline   Lungs:     Clear to auscultation, respirations regular, even and not    labored    Heart:    Regular rhythm and normal rate   Abdomen:     Occasional bowel sounds, no masses, no organomegaly, soft and non-tender, non-distended   Extremities:   No edema, no cyanosis   Pulses:   Radial pulses palpable and equal bilaterally          Results Review:     I reviewed the patient's new clinical results.    Medication Reviewed.    Assessment/Plan     Principal Problem:    Alzheimer's dementia with behavioral disturbance  Active Problems:    Hospice care    COPD (chronic obstructive pulmonary disease)    Diastolic dysfunction, left ventricle    HTN (hypertension)    Daughter presently  not in the room.  Talked with [facility] staff and RN.  Stella to be placed.  Patient has not taken PO meds.  He has received 2 doses 1 mg Ativan thus far today.    He has received 1 doses 2 mg morphine.  Continue symptom management.  Goal of care is comfort per the patient's daughter.    Plan for disposition:HSB.    Jarocho Moscoso MD  08/19/17  11:55 AM

## 2017-08-20 NOTE — PLAN OF CARE
Problem: Patient Care Overview (Adult)  Goal: Plan of Care Review  Outcome: Ongoing (interventions implemented as appropriate)    08/19/17 1746 08/19/17 2022 08/20/17 0413   Coping/Psychosocial Response Interventions   Plan Of Care Reviewed With --  patient;daughter --    Patient Care Overview   Progress improving --  --    Outcome Evaluation   Outcome Summary/Follow up Plan --  --  Patient was medicated PRN for discomfort and restlessness. Daughter went home for the night. Maintained comfort measures per pallaitive care protocol. Pre medicated prior to turns. Will continue to monitor vital signs and comfort.       Goal: Adult Individualization and Mutuality  Outcome: Ongoing (interventions implemented as appropriate)  Goal: Discharge Needs Assessment  Outcome: Ongoing (interventions implemented as appropriate)    Problem: Dying Patient, Actively (Adult)  Goal: Comfort/Pain Control  Outcome: Ongoing (interventions implemented as appropriate)  Goal: Dying Process, Peace and Dignity  Outcome: Ongoing (interventions implemented as appropriate)    Problem: Pressure Ulcer (Adult)  Goal: Signs and Symptoms of Listed Potential Problems Will be Absent or Manageable (Pressure Ulcer)  Outcome: Ongoing (interventions implemented as appropriate)

## 2017-08-20 NOTE — PROGRESS NOTES
Palliative Care/Hospice Follow Up Note       LOS: 2 days   Patient Care Team:  Catarina Strange MD as PCP - General (Family Medicine)    Chief Complaint:  Dementia    Interval History:     Patient Complaints: None  Patient Denies:  None  History taken from:  RN.    Review of Systems: Review of systems could not be obtained due to patient sedation status.    Palliative Performance Scale  Palliative Performance Scale Score: 20%  [place] Symptom Assessment System Revised  Pain Score: no pain  Tiredness Score: Worst possible tiredness  Nausea Score: No nausea  Depression Score: unable to assess  Anxiety Score: No anxiety  Drowsiness Score: Worst possible drowsiness  Lack of Appetite Score: Worst lack of appetite  Wellbeing Score: Best wellbeing  Dyspnea Score: No shortness of breath  Other Problem Score: Best possible response  Source of Information: healthcare professional caregiver  Intervention: medicated/see MAR  Intervention Response: tolerated    Vital Signs  Temp:  [98.7 °F (37.1 °C)-100.1 °F (37.8 °C)] 98.7 °F (37.1 °C)  Heart Rate:  [] 120  Resp:  [14-16] 16  BP: (119-126)/(66-76) 119/66  O2 Device: room air SpO2:  [98 %-99 %] 98 %    Physical Exam:  General Appearance:    Not awake and in no acute distress   Throat:   No oral lesions, oral mucosa moist   Neck:   No adenopathy, supple, trachea midline   Lungs:     Clear to auscultation, mild inspiratory scattered snoring, improved with jaw thrust, respirations regular, even and not    labored    Heart:    Regular rhythm and tachycardia   Abdomen:     Occasional bowel sounds, no masses, no organomegaly, soft and non-tender, non-distended   Extremities:   No edema, no cyanosis   Pulses:   Radial pulses palpable and equal bilaterally          Results Review:     I reviewed the patient's new clinical results.    Medication Reviewed.    Assessment/Plan     Principal Problem:    Alzheimer's dementia with behavioral disturbance  Active Problems:    Hospice  care    COPD (chronic obstructive pulmonary disease)    Diastolic dysfunction, left ventricle    HTN (hypertension)    Daughter presently not in the room.  Talked with RN.  Patient has not taken PO meds, will DC.  He has received 3 doses 1 mg Ativan thus far today (4 doses yesterday).    He has received 2 doses 2 mg morphine (3 doses yesterday).  Continue symptom management.  Goal of care is comfort per the patient's daughter.    Plan for disposition:HSB.    Jarocho Moscoso MD  08/20/17  10:09 AM

## 2017-08-20 NOTE — PLAN OF CARE
Problem: Patient Care Overview (Adult)  Goal: Plan of Care Review  Outcome: Ongoing (interventions implemented as appropriate)    08/20/17 6952   Coping/Psychosocial Response Interventions   Plan Of Care Reviewed With patient;daughter   Patient Care Overview   Progress declining   Outcome Evaluation   Outcome Summary/Follow up Plan Pt not arousing for po intake today. Po meds d/c'd. Becoming congested, scopolamine patch applied, robinul initiated, MD notififed. Daughter called and spoke with RN today. No family at bedside. Premedicated prior to q4h turns. Will continue to provide comfort care.       Goal: Adult Individualization and Mutuality  Outcome: Ongoing (interventions implemented as appropriate)  Goal: Discharge Needs Assessment  Outcome: Ongoing (interventions implemented as appropriate)    Problem: Dying Patient, Actively (Adult)  Goal: Comfort/Pain Control  Outcome: Ongoing (interventions implemented as appropriate)  Goal: Dying Process, Peace and Dignity  Outcome: Ongoing (interventions implemented as appropriate)    Problem: Pressure Ulcer (Adult)  Goal: Signs and Symptoms of Listed Potential Problems Will be Absent or Manageable (Pressure Ulcer)  Outcome: Ongoing (interventions implemented as appropriate)

## 2017-08-21 NOTE — PLAN OF CARE
Problem: Patient Care Overview (Adult)  Goal: Plan of Care Review  Outcome: Ongoing (interventions implemented as appropriate)    08/21/17 1752   Coping/Psychosocial Response Interventions   Plan Of Care Reviewed With daughter   Patient Care Overview   Progress declining   Outcome Evaluation   Outcome Summary/Follow up Plan Congested cough, PRN medication- see MAR. Pain and anxiety medication PRN/pre-medication for repositioning. Patient unresponsive at this time. Continue to monitor.       Goal: Adult Individualization and Mutuality  Outcome: Ongoing (interventions implemented as appropriate)  Goal: Discharge Needs Assessment  Outcome: Ongoing (interventions implemented as appropriate)    Problem: Dying Patient, Actively (Adult)  Goal: Comfort/Pain Control  Outcome: Ongoing (interventions implemented as appropriate)    08/21/17 1752   Dying Patient, Actively (Adult)   Comfort/Pain Control making progress toward outcome       Goal: Dying Process, Peace and Dignity  Outcome: Ongoing (interventions implemented as appropriate)    08/21/17 1752   Dying Patient, Actively (Adult)   Dying Process, Peace and Dignity making progress toward outcome         Problem: Pressure Ulcer (Adult)  Goal: Signs and Symptoms of Listed Potential Problems Will be Absent or Manageable (Pressure Ulcer)  Outcome: Ongoing (interventions implemented as appropriate)    08/21/17 1752   Pressure Ulcer   Problems Assessed (Pressure Ulcer) all   Problems Present (Pressure Ulcer) pain

## 2017-08-21 NOTE — PLAN OF CARE
Problem: Patient Care Overview (Adult)  Goal: Plan of Care Review  Outcome: Ongoing (interventions implemented as appropriate)    08/21/17 0243   Coping/Psychosocial Response Interventions   Plan Of Care Reviewed With patient;family   Patient Care Overview   Progress no change   Outcome Evaluation   Outcome Summary/Follow up Plan Congested cough, not productive. Premedicated for turns. Daughter left but states she will return. Pt peaceful and resting tonight. Will continue to monitor         Problem: Dying Patient, Actively (Adult)  Goal: Comfort/Pain Control  Outcome: Ongoing (interventions implemented as appropriate)    08/21/17 0243   Dying Patient, Actively (Adult)   Comfort/Pain Control making progress toward outcome       Goal: Dying Process, Peace and Dignity  Outcome: Ongoing (interventions implemented as appropriate)    08/21/17 0243   Dying Patient, Actively (Adult)   Dying Process, Peace and Dignity making progress toward outcome         Problem: Pressure Ulcer (Adult)  Goal: Signs and Symptoms of Listed Potential Problems Will be Absent or Manageable (Pressure Ulcer)  Outcome: Ongoing (interventions implemented as appropriate)    08/21/17 0243   Pressure Ulcer   Problems Assessed (Pressure Ulcer) all   Problems Present (Pressure Ulcer) pain

## 2017-08-21 NOTE — PROGRESS NOTES
[facility]    Joselin Aleman  [id number]  2017    Admission R/T [facility] Dx: yes    Reason for [facility] Admission: Alzheimer's    Symptom  Management: pain/agitation/restlessness    Nursing/Medication Recommendations:    Psychosocial Issues and Recommendations: No family present. I will follow-up with daughter to obtain information on  arrangements and life history    Spiritual Concerns and Recommendations:    [facility] Discharge Plans:  Incomplete. Patient is receiving IV medications for comfort/pain care. No plans for discharge as patient appears imminent.      Review of Visit (Include All Collaboration- including names of hospital and family involved during admission/visit): Met with patient at bedside. He is resting comfortably. Patient is pale, unresponsive, no PO intake. No family present. VS: T 99.5; HR 89; RR 14; /76; 82% on RA. Since yesterday patient has received IV Robinul .4 x 7; IV Ativan 1 mg x 8 and 2 mg x 1; IV Morphine 2 mg x 7 and 4 mg x 1. No plans for discharge due to patient's imminent status. [facility] will continue daily visits to assess needs and provide emotional support.      Valentine Rodriguez, DINH   [facility] Clinical

## 2017-08-22 NOTE — PROGRESS NOTES
Palliative Care/Hospice Follow Up Note       LOS: 3 days   Patient Care Team:  Catarina Strange MD as PCP - General (Family Medicine)    Chief Complaint:  Dementia    Interval History:     Patient Complaints: None  Patient Denies:  None  History taken from:  RN.    Review of Systems: Review of systems could not be obtained due to patient sedation status.    Palliative Performance Scale  Palliative Performance Scale Score: 10%  [place] Symptom Assessment System Revised  Pain Score: no pain  Tiredness Score: Worst possible tiredness  Nausea Score: No nausea  Depression Score: unable to assess  Anxiety Score: No anxiety  Drowsiness Score: Worst possible drowsiness  Lack of Appetite Score: Worst lack of appetite  Wellbeing Score: unable to assess  Dyspnea Score: No shortness of breath  Other Problem Score: 8 (congestion)  Source of Information: healthcare professional caregiver  Intervention: medicated/see MAR  Intervention Response: tolerated    Vital Signs  Temp:  [97.5 °F (36.4 °C)-99.5 °F (37.5 °C)] 97.5 °F (36.4 °C)  Heart Rate:  [86-89] 86  Resp:  [14] 14  BP: (106-152)/(63-76) 106/63  O2 Device: room air SpO2:  [68 %-82 %] 68 %    Physical Exam:  General Appearance:    Not awake and lying on his left side and in no acute distress   Throat:   No oral lesions, oral mucosa moist   Neck:   No adenopathy, supple, trachea midline   Lungs:     Clear to auscultation, respirations regular, even and not    labored    Heart:    Regular rhythm and normal rate   Abdomen:     Occasional bowel sounds, no masses, no organomegaly, soft and non-tender, non-distended   Extremities:   No edema, no cyanosis   Pulses:   Radial pulses palpable and equal bilaterally          Results Review:     I reviewed the patient's new clinical results.    Medication Reviewed.    Assessment/Plan     Principal Problem:    Alzheimer's dementia with behavioral disturbance  Active Problems:    Hospice care    COPD (chronic obstructive pulmonary  disease)    Diastolic dysfunction, left ventricle    HTN (hypertension)    Daughter presently not in the room.  Talked with RN.  Patient has not taken PO meds, will DC.  He has received glycopyrrolate for secretions.  He has received 2 doses 1 mg Ativan thus far today (6 doses yesterday).    He has received 3 doses 1 & 2 mg morphine thus far today (5 doses yesterday).  Continue symptom management.  Goal of care is comfort per the patient's daughter.    Plan for disposition:HSB.    Jarocho Moscoso MD  08/21/17  9:36 PM

## 2017-08-22 NOTE — PLAN OF CARE
Problem: Patient Care Overview (Adult)  Goal: Plan of Care Review  Outcome: Ongoing (interventions implemented as appropriate)    08/22/17 4977   Coping/Psychosocial Response Interventions   Plan Of Care Reviewed With patient   Patient Care Overview   Progress declining   Outcome Evaluation   Outcome Summary/Follow up Plan pt premed for turns. pt is unresponsive. family at bedside. continue comfort care.       Goal: Adult Individualization and Mutuality  Outcome: Ongoing (interventions implemented as appropriate)  Goal: Discharge Needs Assessment  Outcome: Ongoing (interventions implemented as appropriate)    Problem: Dying Patient, Actively (Adult)  Goal: Comfort/Pain Control  Outcome: Ongoing (interventions implemented as appropriate)  Goal: Dying Process, Peace and Dignity  Outcome: Ongoing (interventions implemented as appropriate)    Problem: Pressure Ulcer (Adult)  Goal: Signs and Symptoms of Listed Potential Problems Will be Absent or Manageable (Pressure Ulcer)  Outcome: Ongoing (interventions implemented as appropriate)

## 2017-08-22 NOTE — DISCHARGE SUMMARY
DATES OF ADMISSION: 8/6/2017-8/18/2017    REASON FOR ADMISSION: The patient is an 84-year-old white male with a history of dementia admitted with increasing confusion and agitation    LABS:  See chart    HOSPITAL COURSE:  The patient was admitted to the [facility] and placed on [facility] 2 programming.  Aggressive pharmacotherapy was undertaken in hopes of addressing the patient's aggressive behaviors, poor sleep, etc.  Eventually the patient was prescribed lorazepam, Depakote, Zyprexa, and Nuedexta.  Andrews, in spite of the aggressive treatment, the patient showed little response, continuing to be aggressive, sleeping poorly, and requiring a posy restraint.  On 8/18/2017, the patient's family agreed for a hospice referral and discharge was ordered.    FINAL DIAGNOSIS:  Primary dementia Alzheimer's type with behavioral disturbance    DISPOSITION ON DISCHARGE:  The patient is discharged to a  palliative scattered bed.    PROGNOSIS: Grave

## 2017-08-22 NOTE — PLAN OF CARE
Problem: Patient Care Overview (Adult)  Goal: Plan of Care Review  Outcome: Ongoing (interventions implemented as appropriate)    08/21/17 1752 08/21/17 2104 08/22/17 0603   Coping/Psychosocial Response Interventions   Plan Of Care Reviewed With --  patient --    Patient Care Overview   Progress declining --  --    Outcome Evaluation   Outcome Summary/Follow up Plan --  --  Maintained comfort measures per palliative care protocol. Patient was pre medicated prior to turns. Patient is unresponsive. Daughter is at bedside. Will continue to monitor vital signs and comfort.       Goal: Adult Individualization and Mutuality  Outcome: Ongoing (interventions implemented as appropriate)  Goal: Discharge Needs Assessment  Outcome: Ongoing (interventions implemented as appropriate)    Problem: Dying Patient, Actively (Adult)  Goal: Comfort/Pain Control  Outcome: Ongoing (interventions implemented as appropriate)  Goal: Dying Process, Peace and Dignity  Outcome: Ongoing (interventions implemented as appropriate)    Problem: Pressure Ulcer (Adult)  Goal: Signs and Symptoms of Listed Potential Problems Will be Absent or Manageable (Pressure Ulcer)  Outcome: Ongoing (interventions implemented as appropriate)

## 2017-08-23 NOTE — PROGRESS NOTES
Palliative Care/Hospice Follow Up Note       LOS: 4 days   Patient Care Team:  Catarina Strange MD as PCP - General (Family Medicine)    Chief Complaint:  Dementia    Interval History:     Patient Complaints: None  Patient Denies:  None  History taken from:  RN.    Review of Systems: Review of systems could not be obtained due to patient sedation status.    Palliative Performance Scale  Palliative Performance Scale Score: 10%  [place] Symptom Assessment System Revised  Pain Score: 2  Tiredness Score: Worst possible tiredness  Nausea Score: No nausea  Depression Score: No depression  Anxiety Score: 2  Drowsiness Score: Worst possible drowsiness  Lack of Appetite Score: 7  Wellbeing Score: 6  Dyspnea Score: 3  Other Problem Score: 4 (congestion)  Source of Information: healthcare professional caregiver  Intervention: medicated/see MAR  Intervention Response: tolerated    Vital Signs  Temp:  [99.1 °F (37.3 °C)-102.7 °F (39.3 °C)] 102.7 °F (39.3 °C)  Heart Rate:  [86-87] 86  Resp:  [14-18] 18  BP: ()/(53-72) 86/53  O2 Device: room air SpO2:  [63 %-74 %] 63 %    Physical Exam:  General Appearance:    Not awake and lying on his right side and in no acute distress   Throat:   No oral lesions, oral mucosa moist   Neck:   No adenopathy, supple, trachea midline   Lungs:     Clear to auscultation, respirations regular, even and not    labored    Heart:    Regular rhythm and normal rate   Abdomen:     Occasional bowel sounds, no masses, no organomegaly, soft and non-tender, non-distended   Extremities:   No edema, no cyanosis   Pulses:   Radial pulses palpable and equal bilaterally          Results Review:     I reviewed the patient's new clinical results.    Medication Reviewed.    Assessment/Plan     Principal Problem:    Alzheimer's dementia with behavioral disturbance  Active Problems:    Hospice care    COPD (chronic obstructive pulmonary disease)    Diastolic dysfunction, left ventricle    HTN  (hypertension)    Daughter presently not in the room.  Talked with RN.  Patient has not taken PO meds, will DC.  He has received glycopyrrolate for secretions.  He has received 4 doses 2 mg Ativan thus far today (6 doses yesterday).    He has received 1 dose 1 & 3 doses 2 mg morphine thus far today (6 doses yesterday).  Continue symptom management.  Goal of care is comfort per the patient's daughter.    Plan for disposition:HSB.    Jarocho Moscoso MD  08/22/17  8:21 PM

## 2017-08-23 NOTE — PROGRESS NOTES
Continued Stay Note  [facility]     Patient Name: Joselin Aleman  MRN: [id number]  Today's Date: 8/23/2017    Admit Date: 8/18/2017          Discharge Plan       08/23/17 1314    Case Management/Social Work Plan    Plan [facility] following for discharge needs.   MATY Rivera    Additional Comments Pt admitted to [facility] Sc[facility] bed on 8/18. [facility]  MATY Rivera              Discharge Codes     None            Janessa Reyes RN

## 2017-08-23 NOTE — PLAN OF CARE
Problem: Patient Care Overview (Adult)  Goal: Plan of Care Review  Outcome: Ongoing (interventions implemented as appropriate)    08/23/17 0604   Coping/Psychosocial Response Interventions   Plan Of Care Reviewed With son   Patient Care Overview   Progress declining   Outcome Evaluation   Outcome Summary/Follow up Plan Pt remains unresponsive. Premedicated prior to turns. Have turned pt in the recovery position through the night and congestion sounds much better. Pt temp elevated and O2 continues to be low. will contiue comfort care.         Problem: Dying Patient, Actively (Adult)  Goal: Comfort/Pain Control  Outcome: Ongoing (interventions implemented as appropriate)  Goal: Dying Process, Peace and Dignity  Outcome: Ongoing (interventions implemented as appropriate)    Problem: Pressure Ulcer (Adult)  Goal: Signs and Symptoms of Listed Potential Problems Will be Absent or Manageable (Pressure Ulcer)  Outcome: Ongoing (interventions implemented as appropriate)

## 2017-08-23 NOTE — PLAN OF CARE
Problem: Patient Care Overview (Adult)  Goal: Plan of Care Review  Outcome: Ongoing (interventions implemented as appropriate)    08/23/17 0605   Coping/Psychosocial Response Interventions   Plan Of Care Reviewed With family   Patient Care Overview   Progress declining   Outcome Evaluation   Outcome Summary/Follow up Plan Pt turned 9,1, 5. Pt premedicated before turns. pt given extra dose of robinul (0.8mg) for congestion. Continue comfort care.          Goal: Adult Individualization and Mutuality  Outcome: Ongoing (interventions implemented as appropriate)  Goal: Discharge Needs Assessment  Outcome: Ongoing (interventions implemented as appropriate)    Problem: Dying Patient, Actively (Adult)  Goal: Comfort/Pain Control  Outcome: Ongoing (interventions implemented as appropriate)  Goal: Dying Process, Peace and Dignity  Outcome: Ongoing (interventions implemented as appropriate)    Problem: Pressure Ulcer (Adult)  Goal: Signs and Symptoms of Listed Potential Problems Will be Absent or Manageable (Pressure Ulcer)  Outcome: Ongoing (interventions implemented as appropriate)

## 2017-08-23 NOTE — CONSULTS
's initial visit with pt's through [facility]. The son processed his emotions with the  concerning his father (the pt). The son engaged in storytelling and reminisced about his father and family. The  engaged in empathic listening and care. The  offered to visit the pt/family in the morning and offered his assistance for future care. The son had no needs at this time and was appreciative of 's visit.  Chaplain Jesus Holden MDiv.

## 2017-08-23 NOTE — PROGRESS NOTES
[facility] Visit Report    Joselin Aleman  [id number]  8/23/2017    Admission R/T [facility] Dx: yes    Reason for [facility] Admission: Alzeimer's with COPD comorbid    Symptom  Management: Restlessness    Nursing/Medication Recommendations:    Psychosocial Issues and Recommendations:    Spiritual Concerns and Recommendations:    [facility] Discharge Plans:  incomplete; patient remains HSB and [facility] will round daily    Review of Visit (Include All Collaboration- including names of hospital and family involved during admission/visit):  [facility] RN initially unavailable, per EPIC since midnight pt has received IV Ativan 2 mg x4, IV Robinul 0.4 mg x2 and 0.8 mg x2, IV morphine 4 mg x4; VS: T103.2, P96, R20, /65, O2=88%@RA; pt also has 2 Scopolamine patches;    Pt darek and son present, pt non-responsive; family feels pt is comfortable, no concerns; pt having audible congestion; CHP had prayer at bedside at request of family; other pt darek en route from [place], [facility] in tomorrow afternoon, but family are all aware pt may pass prior to her arrival.  P suggested darek en route could talk to pt via phone with ph held at pt ear, family apprec of suggestion.  P assured family of [facility].    P alerted LISE Zamora of audible congestion, Noah to assess and address.    Jose C Gan, [facility]

## 2017-08-24 NOTE — PROGRESS NOTES
Palliative Care/Hospice Follow Up Note       LOS: 6 days   Patient Care Team:  Catarina Strange MD as PCP - General (Family Medicine)    Chief Complaint:  Dementia    Interval History:     Patient Complaints: None  Patient Denies:  None  History taken from:  RN and son.    Review of Systems: Review of systems could not be obtained due to patient sedation status.    Palliative Performance Scale  Palliative Performance Scale Score: 10%  [place] Symptom Assessment System Revised  Pain Score: no pain  Tiredness Score: Worst possible tiredness  Nausea Score: No nausea  Depression Score: unable to assess  Anxiety Score: No anxiety  Drowsiness Score: Worst possible drowsiness  Lack of Appetite Score: Worst lack of appetite  Wellbeing Score: unable to assess  Dyspnea Score: No shortness of breath  Other Problem Score: 3 (congestion)  Source of Information: healthcare professional caregiver  Intervention: medicated/see MAR  Intervention Response: tolerated    Vital Signs  Temp:  [98.8 °F (37.1 °C)-103 °F (39.4 °C)] 103 °F (39.4 °C)  Heart Rate:  [] 103  Resp:  [18] 18  BP: (108-115)/(69) 115/69  O2 Device: room air SpO2:  [57 %-75 %] 57 %    Physical Exam:  General Appearance:    Not awake and lying on his right side and in no acute distress   Throat:   No oral lesions, oral mucosa moist   Neck:   No adenopathy, supple, trachea midline   Lungs:     Clear to auscultation, minimal end expiratory rhonchi, respirations regular with expiratory puffing, not labored    Heart:    Regular rhythm and tachycardia   Abdomen:     Occasional bowel sounds, no masses, no organomegaly, soft and non-tender, non-distended   Extremities:   No edema   Pulses:   Radial pulses palpable and equal bilaterally          Results Review:     I reviewed the patient's new clinical results.    Medication Reviewed.    Assessment/Plan     Principal Problem:    Alzheimer's dementia with behavioral disturbance  Active Problems:    Hospice care     COPD (chronic obstructive pulmonary disease)    Diastolic dysfunction, left ventricle    HTN (hypertension)    Talked with the son at bedside.  The patient has received glycopyrrolate for secretions.  He has received 4 doses 2 mg Ativan thus far today (6 doses yesterday).    He has received 4 doses 4 mg morphine thus far today (6 doses yesterday).  Continue symptom management.  Goal of care is comfort as explained previously by his daughter. The patient is showing deterioration with decreasing O2 sats.    Plan for disposition:HSB.    Jarocho Moscoso MD  08/24/17  6:35 PM

## 2017-08-24 NOTE — PROGRESS NOTES
Palliative Care/Hospice Follow Up Note       LOS: 5 days   Patient Care Team:  Catarina Strange MD as PCP - General (Family Medicine)    Chief Complaint:  Dementia    Interval History:     Patient Complaints: None  Patient Denies:  None  History taken from:  RN and son.    Review of Systems: Review of systems could not be obtained due to patient sedation status.    Palliative Performance Scale  Palliative Performance Scale Score: 10%  [place] Symptom Assessment System Revised  Pain Score: no pain  Tiredness Score: Worst possible tiredness  Nausea Score: No nausea  Depression Score: No depression  Anxiety Score: No anxiety  Drowsiness Score: Worst possible drowsiness  Lack of Appetite Score: Worst lack of appetite  Wellbeing Score: 4  Dyspnea Score: No shortness of breath  Other Problem Score: 4 (congestion)  Source of Information: healthcare professional caregiver  Intervention: medicated/see MAR  Intervention Response: tolerated    Vital Signs  Temp:  [101.4 °F (38.6 °C)-103.2 °F (39.6 °C)] 101.4 °F (38.6 °C)  Heart Rate:  [92-96] 92  Resp:  [20] 20  BP: ()/(57-65) 97/57  O2 Device: room air SpO2:  [68 %-88 %] 68 %    Physical Exam:  General Appearance:    Not awake and lying on his right side and in no acute distress   Throat:   No oral lesions, oral mucosa moist   Neck:   No adenopathy, supple, trachea midline   Lungs:     Clear to auscultation, minimal end expiratory rhonchi, respirations regular with occasional pauses, not labored    Heart:    Regular rhythm and normal rate   Abdomen:     Occasional bowel sounds, no masses, no organomegaly, soft and non-tender, non-distended   Extremities:   No edema, no cyanosis   Pulses:   Radial pulses palpable and equal bilaterally          Results Review:     I reviewed the patient's new clinical results.    Medication Reviewed.    Assessment/Plan     Principal Problem:    Alzheimer's dementia with behavioral disturbance  Active Problems:    Hospice care     COPD (chronic obstructive pulmonary disease)    Diastolic dysfunction, left ventricle    HTN (hypertension)    Talked with the son at bedside.  Talked with RN.  He has received glycopyrrolate for secretions.  He has received 3 doses 2 mg Ativan thus far today (6 doses yesterday).    He has received 3 doses 4 mg morphine thus far today (6 doses yesterday).  Continue symptom management.  Goal of care is comfort as explained previously by his daughter.    Plan for disposition:HSB.    Jarocho Moscoso MD  08/23/17  9:24 PM

## 2017-08-24 NOTE — PLAN OF CARE
Problem: Patient Care Overview (Adult)  Goal: Plan of Care Review  Outcome: Ongoing (interventions implemented as appropriate)    08/23/17 1725 08/24/17 1952   Coping/Psychosocial Response Interventions   Plan Of Care Reviewed With --  son   Patient Care Overview   Progress declining --    Outcome Evaluation   Outcome Summary/Follow up Plan --  Medicated x3 with Morphine, Ativan and Robinul for pain and congestion. Son at bedside today.       Goal: Adult Individualization and Mutuality  Outcome: Ongoing (interventions implemented as appropriate)  Goal: Discharge Needs Assessment  Outcome: Ongoing (interventions implemented as appropriate)    Problem: Dying Patient, Actively (Adult)  Goal: Comfort/Pain Control  Outcome: Ongoing (interventions implemented as appropriate)  Goal: Dying Process, Peace and Dignity  Outcome: Ongoing (interventions implemented as appropriate)    Problem: Pressure Ulcer (Adult)  Goal: Signs and Symptoms of Listed Potential Problems Will be Absent or Manageable (Pressure Ulcer)  Outcome: Ongoing (interventions implemented as appropriate)

## 2017-08-24 NOTE — PLAN OF CARE
Problem: Patient Care Overview (Adult)  Goal: Plan of Care Review  Outcome: Ongoing (interventions implemented as appropriate)    08/24/17 0430   Coping/Psychosocial Response Interventions   Plan Of Care Reviewed With patient;daughter   Outcome Evaluation   Outcome Summary/Follow up Plan IV Morphine, IV Ativan, IV Robinul prior to turns       Goal: Adult Individualization and Mutuality  Outcome: Ongoing (interventions implemented as appropriate)  Goal: Discharge Needs Assessment  Outcome: Ongoing (interventions implemented as appropriate)    Problem: Dying Patient, Actively (Adult)  Goal: Comfort/Pain Control  Outcome: Ongoing (interventions implemented as appropriate)  Goal: Dying Process, Peace and Dignity  Outcome: Ongoing (interventions implemented as appropriate)    Problem: Pressure Ulcer (Adult)  Goal: Signs and Symptoms of Listed Potential Problems Will be Absent or Manageable (Pressure Ulcer)  Outcome: Ongoing (interventions implemented as appropriate)

## 2017-08-25 NOTE — DISCHARGE SUMMARY
Discharge As      Date of Admisssion:  2017  Date of Death:  2017  Time of Death:  7:52 PM    Patient Care Team:  Catarina Strange MD as PCP - General (Family Medicine)    Final Diagnosis:   Principal Problem:    Alzheimer's dementia with behavioral disturbance  Active Problems:    Hospice care    COPD (chronic obstructive pulmonary disease)    Diastolic dysfunction, left ventricle    HTN (hypertension)          Presenting Problem/History of Present Illness  Alzheimer's dementia with behavioral disturbance [G30.8, F02.81]      Hospital Course  Patient was a 84 y.o. male presented who has a history of dementia dating back to .  He has been a resident at [facility] for some time.  He apparently will not be able to return to that facility as he is now thought to be in need of a higher level of care.  Upon admission, the family was considering pursuing a palliative consultation.  The patient was diagnosed with an acute urinary tract infection and was treated. Upon admit, he was taking mirtazapine Namenda, Seroquel and Depakote.  The patient was admitted to the [facility] approximately 10 days prior to his [facility] admission with worsening agitation.  The patient had not demonstrated any significant improvement since acute care admission 8/3/2017 and [facility] admission 2017.  Subsequently, [facility] evaluated and all agreed to admit the patient as a HSB. I assumed the patient's care.  I did talk with the daughter at his admission.  I talked with the son on several occasions.  Earlier today, I talked with the son when I examined the patient.  The patient had been worsening and symptoms were being treated.  Subsequently, I was called that he passed away at 1952 hours.      Jarocho Moscoso MD  17  9:51 PM

## 2017-08-25 NOTE — PROGRESS NOTES
Discharge Planning Assessment  [facility]     Patient Name: Joselin Aleman  MRN: [id number]  Today's Date: 2017    Admit Date: 2017          Discharge Needs Assessment     None            Discharge Plan       17 1432    Final Note    Final Note The patient  on 17 @ 19:52. NATALYA Dent RN, CCP        Discharge Placement     No information found        Expected Discharge Date and Time     Expected Discharge Date Expected Discharge Time    Aug 24, 2017               Demographic Summary     None            Functional Status     None            Psychosocial     None            Abuse/Neglect     None            Legal     None            Substance Abuse     None            Patient Forms     None          Cecilia Dent, RN
